# Patient Record
Sex: MALE | Race: WHITE | NOT HISPANIC OR LATINO | URBAN - METROPOLITAN AREA
[De-identification: names, ages, dates, MRNs, and addresses within clinical notes are randomized per-mention and may not be internally consistent; named-entity substitution may affect disease eponyms.]

---

## 2017-02-17 ENCOUNTER — AMBULATORY - HEALTHEAST (OUTPATIENT)
Dept: FAMILY MEDICINE | Facility: CLINIC | Age: 52
End: 2017-02-17

## 2017-02-17 ENCOUNTER — AMBULATORY - HEALTHEAST (OUTPATIENT)
Dept: LAB | Facility: CLINIC | Age: 52
End: 2017-02-17

## 2017-02-17 DIAGNOSIS — Z00.00 HEALTH CARE MAINTENANCE: ICD-10-CM

## 2017-02-20 ENCOUNTER — COMMUNICATION - HEALTHEAST (OUTPATIENT)
Dept: FAMILY MEDICINE | Facility: CLINIC | Age: 52
End: 2017-02-20

## 2017-02-20 LAB — HCV AB SERPL QL IA: NEGATIVE

## 2017-02-24 ENCOUNTER — COMMUNICATION - HEALTHEAST (OUTPATIENT)
Dept: FAMILY MEDICINE | Facility: CLINIC | Age: 52
End: 2017-02-24

## 2017-02-24 DIAGNOSIS — G47.00 INSOMNIA, UNSPECIFIED: ICD-10-CM

## 2017-04-22 ENCOUNTER — COMMUNICATION - HEALTHEAST (OUTPATIENT)
Dept: FAMILY MEDICINE | Facility: CLINIC | Age: 52
End: 2017-04-22

## 2017-04-22 DIAGNOSIS — G47.00 INSOMNIA, UNSPECIFIED: ICD-10-CM

## 2017-06-22 ENCOUNTER — COMMUNICATION - HEALTHEAST (OUTPATIENT)
Dept: FAMILY MEDICINE | Facility: CLINIC | Age: 52
End: 2017-06-22

## 2017-06-22 DIAGNOSIS — I10 HTN (HYPERTENSION): ICD-10-CM

## 2017-06-22 DIAGNOSIS — Z00.00 HEALTH CARE MAINTENANCE: ICD-10-CM

## 2017-07-25 ENCOUNTER — COMMUNICATION - HEALTHEAST (OUTPATIENT)
Dept: FAMILY MEDICINE | Facility: CLINIC | Age: 52
End: 2017-07-25

## 2017-07-25 DIAGNOSIS — G47.00 INSOMNIA, UNSPECIFIED: ICD-10-CM

## 2020-07-16 ENCOUNTER — COMMUNICATION - HEALTHEAST (OUTPATIENT)
Dept: CARDIOLOGY | Facility: CLINIC | Age: 55
End: 2020-07-16

## 2020-07-17 ENCOUNTER — OFFICE VISIT - HEALTHEAST (OUTPATIENT)
Dept: CARDIOLOGY | Facility: CLINIC | Age: 55
End: 2020-07-17

## 2020-07-17 DIAGNOSIS — I10 ESSENTIAL HYPERTENSION: ICD-10-CM

## 2020-07-17 DIAGNOSIS — R55 VASOVAGAL SYNCOPE: ICD-10-CM

## 2020-07-17 DIAGNOSIS — F10.11 H/O ETOH ABUSE: ICD-10-CM

## 2020-07-17 ASSESSMENT — MIFFLIN-ST. JEOR: SCORE: 1607.16

## 2021-06-04 VITALS
RESPIRATION RATE: 16 BRPM | SYSTOLIC BLOOD PRESSURE: 110 MMHG | HEIGHT: 71 IN | BODY MASS INDEX: 23.24 KG/M2 | OXYGEN SATURATION: 97 % | WEIGHT: 166 LBS | HEART RATE: 73 BPM | DIASTOLIC BLOOD PRESSURE: 86 MMHG

## 2021-06-09 NOTE — TELEPHONE ENCOUNTER
Wellness Screening Tool  Symptom Screening:  Do you have one of the following NEW symptoms:    Fever (subjective or >100.0)?  No    A new cough?  No    Shortness of breath?  No     Chills? No     New loss of taste or smell? No     Generalized body aches? No     New persistent headache? No     New sore throat? No     Nausea, vomiting, or diarrhea?  No    Within the past 3 weeks, have you been exposed to someone with a known positive illness below:    COVID-19 (known or suspected)?  No    Chicken pox?  No    Mealses?  No    Pertussis?  No    Patient notified of visitor policy- They may have one person accompany them to their appointment, but they will need to wear a mask and will be screened upon arrival for symptoms: Yes  Pt informed to wear a mask: Yes  Pt notified if they develop any symptoms listed above, prior to their appointment, they are to call the clinic directly at [phone] for further instructions.  Yes  Patient's appointment status: Patient will be seen in clinic as scheduled on 7/17/20.

## 2021-06-29 NOTE — PROGRESS NOTES
Progress Notes by Nasim Lawton MD at 7/17/2020  2:20 PM     Author: Nasim Lawton MD Service: -- Author Type: Physician    Filed: 7/17/2020  3:01 PM Encounter Date: 7/17/2020 Status: Signed    : Nasim Lawton MD (Physician)           Click to link to [facility] NOTE    Thank you, Dr. Jain, for asking me to see Carlos Ivy in consultation at [facility] to evaluate syncope.      Assessment/Plan:   1.  Syncope: Patient's syncope most likely related to dehydration because the patient did not eat and drink of fluid instead of 4-5 beers.  His alcohol level was 225.  A few seconds of her syncopal episode occurred during plasma donation.  Patient had no chest pain or shortness of breath no palpitations.  He did not have recurrent episode, no lightheadedness, dizziness.  His blood pressure and heart rate are in normal range.  No indication for further cardiac evaluation at this time.  I did ask him to call me back if he developed similar symptoms again.  Patient agreed with the plan.    2.  Essential hypertension: His blood pressure is controlled with amlodipine 5 mg daily.    3.  Alcohol abuse: Patient states that he has drinking alcohol for 30 years.  He tried to quit several times but not successful.  He has no interest in quitting alcohol abuse at this time.      Thank you for the opportunity to be involved in the care of Carlos Ivy. If you have any questions, please feel free to contact me.  I will see the patient again as needed.    Much or all of the text in this note was generated through the use of Dragon Dictate voice-to-text software. Errors in spelling or words which seem out of context are unintentional.   Sound alike errors, in particular, may have escaped editing.       History of Present Illness:   It is my pleasure to see Carlos Ivy at the [facility] for evaluation of Consult. Carlos Ivy is a 54 y.o. male with a medical  history of essential hypertension, depression and anxiety, alcohol abuse.    The patient is referred to cardiology clinic from emergency room for evaluation of her syncope.    The patient was donating plasma 9 days ago.  He did not eat and drink water instead of 4-5 beers.  During plasma donation, suddenly he felt lightheaded nausea and then passed out for several seconds.  He never had a syncopal episode.  He denies any chest pain, shortness of breath, palpitations, orthopnea, PND or leg edema.  He had no fever chills and cough.  He was sent to emergency room.  His evaluation was not impressive.  He did not have any more episode of lightheadedness, dizziness, syncope over last 9 days.    His ECG showed sinus rhythm with no ischemic ST-T changes, QT interval 466 ms.  His troponin, BNP are normal.      Past Medical History:     Patient Active Problem List   Diagnosis   ? HTN (hypertension)   ? Depression   ? Anxiety   ? Insomnia   ? H/O ETOH abuse       Past Surgical History:   History reviewed. No pertinent surgical history.    Family History:   Reviewed: No family history of sudden cardiac death or CAD.    Social History:    reports that he has been smoking cigarettes. He has a 20.00 pack-year smoking history. He has never used smokeless tobacco. He reports current alcohol use. He reports that he does not use drugs.    Review of Systems:   General: Weight Loss  Eyes: WNL  Ears/Nose/Throat: WNL  Lungs: WNL  Heart: WNL  Stomach: WNL  Bladder: WNL  Muscle/Joints: WNL  Skin: WNL  Nervous System: WNL  Mental Health: WNL     Blood: WNL    Meds:     Current Outpatient Medications:   ?  amLODIPine (NORVASC) 10 MG tablet, Take 1 tablet (10 mg total) by mouth daily., Disp: 90 tablet, Rfl: 1  ?  escitalopram oxalate (LEXAPRO) 20 MG tablet, Take 1 tablet (20 mg total) by mouth daily., Disp: 90 tablet, Rfl: 3  ?  traZODone (DESYREL) 50 MG tablet, TAKE 1 TO 3 TABLETS BY MOUTH EVERY NIGHT AT BEDTIME AS NEEDED FOR SLEEP, Disp: 270  "tablet, Rfl: 0  ?  cyclobenzaprine (FLEXERIL) 10 MG tablet, Take 1 tablet (10 mg total) by mouth every 8 (eight) hours as needed for muscle spasms., Disp: 15 tablet, Rfl: 1  ?  diphenhydrAMINE (BENADRYL) 25 mg capsule, Take 1-2 capsules (25-50 mg total) by mouth as needed., Disp: 30 capsule, Rfl: 2  ?  QUEtiapine (SEROQUEL) 100 MG tablet, Take 1-2 tablets at night, Disp: 180 tablet, Rfl: 3  ?  QUEtiapine (SEROQUEL) 100 MG tablet, Take 1-2 tablets (100-200 mg total) by mouth at bedtime., Disp: 180 tablet, Rfl: 0  No current facility-administered medications for this visit.     Facility-Administered Medications Ordered in Other Visits:   ?  iohexol 350 mg iodine/mL injection 100 mL (OMNIPAQUE), 100 mL, Intravenous, Once in imaging, Miguel Angel Broussard MD     Allergies:   Patient has no known allergies.    Objective:      Physical Exam  166 lb (75.3 kg)  5' 10.5\" (1.791 m)  Body mass index is 23.48 kg/m .  /86 (Patient Site: Left Leg, Patient Position: Sitting, Cuff Size: Adult Regular)   Pulse 73   Resp 16   Ht 5' 10.5\" (1.791 m)   Wt 166 lb (75.3 kg) Comment: With shoes.  SpO2 97%   BMI 23.48 kg/m      General Appearance:   Awake, Alert, No acute distress.   HEENT:  Pupil equal, reactive to light. No scleral icterus; the mucous membranes were moist. No oral ulcers or thrush.    Neck: No cervical bruits. No JVD. No thyromegaly. No lymph node enlargement or tenderness.   Chest: The spine was straight. The chest was symmetric.   Lungs:   Respirations unlabored. Lungs are clear to auscultation. No crackles. No wheezing.   Cardiovascular:   RRR, normal first and second heart sounds with no murmurs. No rubs or gallops.    Abdomen:  Soft. No tenderness. Non-distended. Bowels sounds are present   Extremities: Equal posterior tibial pulses. No leg edema.   Skin: No rashes or ulcers. Warm, Dry.   Musculoskeletal: No tenderness. No deformity.   Neurologic: Mood and affect are appropriate. No focal deficits.     "     EKG:  Personally reivewed  Normal sinus rhythm   Prolonged  ms  Abnormal ECG   No previous ECGs available    Lab Review   Lab Results   Component Value Date     (L) 07/08/2020    K 3.8 07/08/2020     07/08/2020    CO2 16 (L) 07/08/2020    BUN 3 (L) 07/08/2020    CREATININE 0.70 07/08/2020    CALCIUM 7.9 (L) 07/08/2020     Lab Results   Component Value Date    WBC 5.6 07/08/2020    HGB 14.0 07/08/2020    HCT 39.6 (L) 07/08/2020    MCV 91 07/08/2020     07/08/2020     Lab Results   Component Value Date    CHOL 168 02/25/2016    TRIG 91 02/25/2016    HDL 70 02/25/2016     Lab Results   Component Value Date    TROPONINI <0.01 07/08/2020     Lab Results   Component Value Date    BNP <10 07/08/2020     Lab Results   Component Value Date    TSH 1.06 07/08/2020

## 2025-01-22 ENCOUNTER — APPOINTMENT (OUTPATIENT)
Dept: CT IMAGING | Facility: HOSPITAL | Age: 60
End: 2025-01-22
Attending: INTERNAL MEDICINE
Payer: COMMERCIAL

## 2025-01-22 ENCOUNTER — APPOINTMENT (OUTPATIENT)
Dept: CT IMAGING | Facility: HOSPITAL | Age: 60
End: 2025-01-22
Attending: EMERGENCY MEDICINE
Payer: COMMERCIAL

## 2025-01-22 ENCOUNTER — HOSPITAL ENCOUNTER (INPATIENT)
Facility: HOSPITAL | Age: 60
End: 2025-01-22
Attending: EMERGENCY MEDICINE | Admitting: INTERNAL MEDICINE
Payer: COMMERCIAL

## 2025-01-22 ENCOUNTER — APPOINTMENT (OUTPATIENT)
Dept: NEUROLOGY | Facility: HOSPITAL | Age: 60
End: 2025-01-22
Attending: INTERNAL MEDICINE
Payer: COMMERCIAL

## 2025-01-22 DIAGNOSIS — R56.9 ALCOHOL WITHDRAWAL SEIZURE WITH COMPLICATION (H): Primary | ICD-10-CM

## 2025-01-22 DIAGNOSIS — R33.8 ACUTE URINARY RETENTION: ICD-10-CM

## 2025-01-22 DIAGNOSIS — E88.89 ALCOHOLIC KETOSIS (H): ICD-10-CM

## 2025-01-22 DIAGNOSIS — E56.9 VITAMIN DEFICIENCY: ICD-10-CM

## 2025-01-22 DIAGNOSIS — F10.939 ALCOHOL WITHDRAWAL SEIZURE WITH COMPLICATION (H): Primary | ICD-10-CM

## 2025-01-22 DIAGNOSIS — R52 PAIN: ICD-10-CM

## 2025-01-22 DIAGNOSIS — F10.90 ALCOHOL USE DISORDER: ICD-10-CM

## 2025-01-22 LAB
ALBUMIN SERPL BCG-MCNC: 3.8 G/DL (ref 3.5–5.2)
ALBUMIN UR-MCNC: NEGATIVE MG/DL
ALP SERPL-CCNC: 62 U/L (ref 40–150)
ALT SERPL W P-5'-P-CCNC: 46 U/L (ref 0–70)
AMMONIA PLAS-SCNC: 12 UMOL/L (ref 16–60)
AMPHETAMINES UR QL SCN: ABNORMAL
ANION GAP SERPL CALCULATED.3IONS-SCNC: 18 MMOL/L (ref 7–15)
APPEARANCE UR: CLEAR
AST SERPL W P-5'-P-CCNC: 42 U/L (ref 0–45)
BARBITURATES UR QL SCN: ABNORMAL
BASOPHILS # BLD AUTO: 0.1 10E3/UL (ref 0–0.2)
BASOPHILS NFR BLD AUTO: 1 %
BENZODIAZ UR QL SCN: ABNORMAL
BILIRUB DIRECT SERPL-MCNC: 0.43 MG/DL (ref 0–0.3)
BILIRUB SERPL-MCNC: 1.3 MG/DL
BILIRUB UR QL STRIP: NEGATIVE
BUN SERPL-MCNC: 18.7 MG/DL (ref 6–20)
BZE UR QL SCN: ABNORMAL
CALCIUM SERPL-MCNC: 9.7 MG/DL (ref 8.8–10.4)
CANNABINOIDS UR QL SCN: ABNORMAL
CHLORIDE SERPL-SCNC: 96 MMOL/L (ref 98–107)
COLOR UR AUTO: ABNORMAL
CREAT SERPL-MCNC: 0.74 MG/DL (ref 0.67–1.17)
EGFRCR SERPLBLD CKD-EPI 2021: >90 ML/MIN/1.73M2
EOSINOPHIL # BLD AUTO: 0 10E3/UL (ref 0–0.7)
EOSINOPHIL NFR BLD AUTO: 0 %
ERYTHROCYTE [DISTWIDTH] IN BLOOD BY AUTOMATED COUNT: 13.5 % (ref 10–15)
ETHANOL SERPL-MCNC: <0.01 G/DL
FENTANYL UR QL: ABNORMAL
GLUCOSE SERPL-MCNC: 106 MG/DL (ref 70–99)
GLUCOSE UR STRIP-MCNC: NEGATIVE MG/DL
HCO3 SERPL-SCNC: 20 MMOL/L (ref 22–29)
HCT VFR BLD AUTO: 37.9 % (ref 40–53)
HGB BLD-MCNC: 13.7 G/DL (ref 13.3–17.7)
HGB UR QL STRIP: NEGATIVE
HOLD SPECIMEN: NORMAL
IMM GRANULOCYTES # BLD: 0.1 10E3/UL
IMM GRANULOCYTES NFR BLD: 1 %
KETONES UR STRIP-MCNC: 60 MG/DL
LEUKOCYTE ESTERASE UR QL STRIP: NEGATIVE
LIPASE SERPL-CCNC: 30 U/L (ref 13–60)
LYMPHOCYTES # BLD AUTO: 1.9 10E3/UL (ref 0.8–5.3)
LYMPHOCYTES NFR BLD AUTO: 19 %
MAGNESIUM SERPL-MCNC: 1.9 MG/DL (ref 1.7–2.3)
MCH RBC QN AUTO: 32.2 PG (ref 26.5–33)
MCHC RBC AUTO-ENTMCNC: 36.1 G/DL (ref 31.5–36.5)
MCV RBC AUTO: 89 FL (ref 78–100)
MONOCYTES # BLD AUTO: 0.9 10E3/UL (ref 0–1.3)
MONOCYTES NFR BLD AUTO: 9 %
MUCOUS THREADS #/AREA URNS LPF: PRESENT /LPF
NEUTROPHILS # BLD AUTO: 7.1 10E3/UL (ref 1.6–8.3)
NEUTROPHILS NFR BLD AUTO: 71 %
NITRATE UR QL: NEGATIVE
NRBC # BLD AUTO: 0 10E3/UL
NRBC BLD AUTO-RTO: 0 /100
OPIATES UR QL SCN: ABNORMAL
PCP QUAL URINE (ROCHE): ABNORMAL
PH UR STRIP: 6 [PH] (ref 5–7)
PHOSPHATE SERPL-MCNC: 3.1 MG/DL (ref 2.5–4.5)
PLATELET # BLD AUTO: 180 10E3/UL (ref 150–450)
POTASSIUM SERPL-SCNC: 3.3 MMOL/L (ref 3.4–5.3)
POTASSIUM SERPL-SCNC: 3.4 MMOL/L (ref 3.4–5.3)
PROT SERPL-MCNC: 6.9 G/DL (ref 6.4–8.3)
RBC # BLD AUTO: 4.25 10E6/UL (ref 4.4–5.9)
RBC URINE: <1 /HPF
SODIUM SERPL-SCNC: 134 MMOL/L (ref 135–145)
SP GR UR STRIP: 1.02 (ref 1–1.03)
SQUAMOUS EPITHELIAL: <1 /HPF
UROBILINOGEN UR STRIP-MCNC: 4 MG/DL
WBC # BLD AUTO: 10 10E3/UL (ref 4–11)
WBC URINE: 2 /HPF

## 2025-01-22 PROCEDURE — 74177 CT ABD & PELVIS W/CONTRAST: CPT

## 2025-01-22 PROCEDURE — 70450 CT HEAD/BRAIN W/O DYE: CPT

## 2025-01-22 PROCEDURE — G0378 HOSPITAL OBSERVATION PER HR: HCPCS

## 2025-01-22 PROCEDURE — 96375 TX/PRO/DX INJ NEW DRUG ADDON: CPT

## 2025-01-22 PROCEDURE — 83735 ASSAY OF MAGNESIUM: CPT | Performed by: EMERGENCY MEDICINE

## 2025-01-22 PROCEDURE — 82140 ASSAY OF AMMONIA: CPT | Performed by: INTERNAL MEDICINE

## 2025-01-22 PROCEDURE — 84132 ASSAY OF SERUM POTASSIUM: CPT | Performed by: INTERNAL MEDICINE

## 2025-01-22 PROCEDURE — 36415 COLL VENOUS BLD VENIPUNCTURE: CPT | Performed by: EMERGENCY MEDICINE

## 2025-01-22 PROCEDURE — 96361 HYDRATE IV INFUSION ADD-ON: CPT

## 2025-01-22 PROCEDURE — 99223 1ST HOSP IP/OBS HIGH 75: CPT | Mod: AI | Performed by: INTERNAL MEDICINE

## 2025-01-22 PROCEDURE — 258N000003 HC RX IP 258 OP 636: Performed by: EMERGENCY MEDICINE

## 2025-01-22 PROCEDURE — HZ2ZZZZ DETOXIFICATION SERVICES FOR SUBSTANCE ABUSE TREATMENT: ICD-10-PCS | Performed by: INTERNAL MEDICINE

## 2025-01-22 PROCEDURE — 72125 CT NECK SPINE W/O DYE: CPT

## 2025-01-22 PROCEDURE — 81003 URINALYSIS AUTO W/O SCOPE: CPT | Performed by: INTERNAL MEDICINE

## 2025-01-22 PROCEDURE — 258N000001 HC RX 258: Performed by: INTERNAL MEDICINE

## 2025-01-22 PROCEDURE — 95816 EEG AWAKE AND DROWSY: CPT | Mod: 26 | Performed by: PSYCHIATRY & NEUROLOGY

## 2025-01-22 PROCEDURE — 250N000011 HC RX IP 250 OP 636: Performed by: INTERNAL MEDICINE

## 2025-01-22 PROCEDURE — 96360 HYDRATION IV INFUSION INIT: CPT

## 2025-01-22 PROCEDURE — 82248 BILIRUBIN DIRECT: CPT | Performed by: EMERGENCY MEDICINE

## 2025-01-22 PROCEDURE — 82077 ASSAY SPEC XCP UR&BREATH IA: CPT | Performed by: EMERGENCY MEDICINE

## 2025-01-22 PROCEDURE — 80053 COMPREHEN METABOLIC PANEL: CPT | Performed by: EMERGENCY MEDICINE

## 2025-01-22 PROCEDURE — 83690 ASSAY OF LIPASE: CPT | Performed by: INTERNAL MEDICINE

## 2025-01-22 PROCEDURE — 93005 ELECTROCARDIOGRAM TRACING: CPT | Performed by: EMERGENCY MEDICINE

## 2025-01-22 PROCEDURE — 95819 EEG AWAKE AND ASLEEP: CPT

## 2025-01-22 PROCEDURE — 84100 ASSAY OF PHOSPHORUS: CPT | Performed by: INTERNAL MEDICINE

## 2025-01-22 PROCEDURE — 96374 THER/PROPH/DIAG INJ IV PUSH: CPT

## 2025-01-22 PROCEDURE — 36415 COLL VENOUS BLD VENIPUNCTURE: CPT | Performed by: INTERNAL MEDICINE

## 2025-01-22 PROCEDURE — 99285 EMERGENCY DEPT VISIT HI MDM: CPT | Mod: 25

## 2025-01-22 PROCEDURE — 85025 COMPLETE CBC W/AUTO DIFF WBC: CPT | Performed by: EMERGENCY MEDICINE

## 2025-01-22 PROCEDURE — 80307 DRUG TEST PRSMV CHEM ANLYZR: CPT | Performed by: EMERGENCY MEDICINE

## 2025-01-22 PROCEDURE — 250N000013 HC RX MED GY IP 250 OP 250 PS 637: Performed by: INTERNAL MEDICINE

## 2025-01-22 PROCEDURE — 96376 TX/PRO/DX INJ SAME DRUG ADON: CPT

## 2025-01-22 RX ORDER — GABAPENTIN 300 MG/1
300 CAPSULE ORAL EVERY 8 HOURS
Status: DISCONTINUED | OUTPATIENT
Start: 2025-01-27 | End: 2025-01-28 | Stop reason: HOSPADM

## 2025-01-22 RX ORDER — FLUMAZENIL 0.1 MG/ML
0.2 INJECTION, SOLUTION INTRAVENOUS
Status: DISCONTINUED | OUTPATIENT
Start: 2025-01-22 | End: 2025-01-22

## 2025-01-22 RX ORDER — DIAZEPAM 10 MG/2ML
5-10 INJECTION, SOLUTION INTRAMUSCULAR; INTRAVENOUS EVERY 30 MIN PRN
Status: DISCONTINUED | OUTPATIENT
Start: 2025-01-22 | End: 2025-01-26

## 2025-01-22 RX ORDER — AMLODIPINE BESYLATE 2.5 MG/1
2.5 TABLET ORAL DAILY
Status: DISCONTINUED | OUTPATIENT
Start: 2025-01-22 | End: 2025-01-28 | Stop reason: HOSPADM

## 2025-01-22 RX ORDER — THIAMINE HYDROCHLORIDE 100 MG/ML
100 INJECTION, SOLUTION INTRAMUSCULAR; INTRAVENOUS DAILY
Status: DISCONTINUED | OUTPATIENT
Start: 2025-01-22 | End: 2025-01-22

## 2025-01-22 RX ORDER — ACETAMINOPHEN 325 MG/1
650 TABLET ORAL EVERY 4 HOURS PRN
Status: DISCONTINUED | OUTPATIENT
Start: 2025-01-22 | End: 2025-01-28 | Stop reason: HOSPADM

## 2025-01-22 RX ORDER — DIAZEPAM 10 MG/1
10 TABLET ORAL EVERY 30 MIN PRN
Status: DISCONTINUED | OUTPATIENT
Start: 2025-01-22 | End: 2025-01-22

## 2025-01-22 RX ORDER — FLUMAZENIL 0.1 MG/ML
0.2 INJECTION, SOLUTION INTRAVENOUS
Status: DISCONTINUED | OUTPATIENT
Start: 2025-01-22 | End: 2025-01-28 | Stop reason: HOSPADM

## 2025-01-22 RX ORDER — HALOPERIDOL 5 MG/ML
2.5-5 INJECTION INTRAMUSCULAR EVERY 6 HOURS PRN
Status: DISCONTINUED | OUTPATIENT
Start: 2025-01-22 | End: 2025-01-22

## 2025-01-22 RX ORDER — AMOXICILLIN 250 MG
2 CAPSULE ORAL 2 TIMES DAILY PRN
Status: DISCONTINUED | OUTPATIENT
Start: 2025-01-22 | End: 2025-01-28 | Stop reason: HOSPADM

## 2025-01-22 RX ORDER — GABAPENTIN 300 MG/1
900 CAPSULE ORAL EVERY 8 HOURS
Status: COMPLETED | OUTPATIENT
Start: 2025-01-22 | End: 2025-01-25

## 2025-01-22 RX ORDER — AMOXICILLIN 250 MG
1 CAPSULE ORAL 2 TIMES DAILY PRN
Status: DISCONTINUED | OUTPATIENT
Start: 2025-01-22 | End: 2025-01-28 | Stop reason: HOSPADM

## 2025-01-22 RX ORDER — FOLIC ACID 5 MG/ML
1 INJECTION, SOLUTION INTRAMUSCULAR; INTRAVENOUS; SUBCUTANEOUS DAILY
Status: DISCONTINUED | OUTPATIENT
Start: 2025-01-22 | End: 2025-01-27

## 2025-01-22 RX ORDER — CALCIUM CARBONATE 500 MG/1
1000 TABLET, CHEWABLE ORAL 4 TIMES DAILY PRN
Status: DISCONTINUED | OUTPATIENT
Start: 2025-01-22 | End: 2025-01-28 | Stop reason: HOSPADM

## 2025-01-22 RX ORDER — OLANZAPINE 5 MG/1
5-10 TABLET, ORALLY DISINTEGRATING ORAL EVERY 6 HOURS PRN
Status: DISCONTINUED | OUTPATIENT
Start: 2025-01-22 | End: 2025-01-22

## 2025-01-22 RX ORDER — DIAZEPAM 10 MG/1
10 TABLET ORAL EVERY 30 MIN PRN
Status: DISCONTINUED | OUTPATIENT
Start: 2025-01-22 | End: 2025-01-26

## 2025-01-22 RX ORDER — DEXTROSE MONOHYDRATE, SODIUM CHLORIDE, AND POTASSIUM CHLORIDE 50; 1.49; 9 G/1000ML; G/1000ML; G/1000ML
INJECTION, SOLUTION INTRAVENOUS CONTINUOUS
Status: DISCONTINUED | OUTPATIENT
Start: 2025-01-22 | End: 2025-01-25

## 2025-01-22 RX ORDER — OLANZAPINE 5 MG/1
5-10 TABLET, ORALLY DISINTEGRATING ORAL EVERY 6 HOURS PRN
Status: DISCONTINUED | OUTPATIENT
Start: 2025-01-22 | End: 2025-01-28 | Stop reason: HOSPADM

## 2025-01-22 RX ORDER — HALOPERIDOL 5 MG/ML
2.5-5 INJECTION INTRAMUSCULAR EVERY 6 HOURS PRN
Status: DISCONTINUED | OUTPATIENT
Start: 2025-01-22 | End: 2025-01-28 | Stop reason: HOSPADM

## 2025-01-22 RX ORDER — THIAMINE HYDROCHLORIDE 100 MG/ML
500 INJECTION, SOLUTION INTRAMUSCULAR; INTRAVENOUS 3 TIMES DAILY
Status: COMPLETED | OUTPATIENT
Start: 2025-01-22 | End: 2025-01-24

## 2025-01-22 RX ORDER — POTASSIUM CHLORIDE 1.5 G/1.58G
40 POWDER, FOR SOLUTION ORAL ONCE
Status: COMPLETED | OUTPATIENT
Start: 2025-01-22 | End: 2025-01-22

## 2025-01-22 RX ORDER — GABAPENTIN 400 MG/1
1200 CAPSULE ORAL ONCE
Status: COMPLETED | OUTPATIENT
Start: 2025-01-22 | End: 2025-01-22

## 2025-01-22 RX ORDER — MULTIPLE VITAMINS W/ MINERALS TAB 9MG-400MCG
1 TAB ORAL DAILY
Status: DISCONTINUED | OUTPATIENT
Start: 2025-01-22 | End: 2025-01-28 | Stop reason: HOSPADM

## 2025-01-22 RX ORDER — PANTOPRAZOLE SODIUM 20 MG/1
20 TABLET, DELAYED RELEASE ORAL
Status: DISCONTINUED | OUTPATIENT
Start: 2025-01-22 | End: 2025-01-28 | Stop reason: HOSPADM

## 2025-01-22 RX ORDER — DIAZEPAM 10 MG/1
10 TABLET ORAL EVERY 6 HOURS
Status: DISCONTINUED | OUTPATIENT
Start: 2025-01-22 | End: 2025-01-23

## 2025-01-22 RX ORDER — DIAZEPAM 10 MG/2ML
5-10 INJECTION, SOLUTION INTRAMUSCULAR; INTRAVENOUS EVERY 30 MIN PRN
Status: DISCONTINUED | OUTPATIENT
Start: 2025-01-22 | End: 2025-01-22

## 2025-01-22 RX ORDER — POLYETHYLENE GLYCOL 3350 17 G/17G
17 POWDER, FOR SOLUTION ORAL 2 TIMES DAILY PRN
Status: DISCONTINUED | OUTPATIENT
Start: 2025-01-22 | End: 2025-01-28 | Stop reason: HOSPADM

## 2025-01-22 RX ORDER — CLONIDINE HYDROCHLORIDE 0.1 MG/1
0.1 TABLET ORAL EVERY 8 HOURS
Status: DISCONTINUED | OUTPATIENT
Start: 2025-01-22 | End: 2025-01-23

## 2025-01-22 RX ORDER — ACETAMINOPHEN 650 MG/1
650 SUPPOSITORY RECTAL EVERY 4 HOURS PRN
Status: DISCONTINUED | OUTPATIENT
Start: 2025-01-22 | End: 2025-01-28 | Stop reason: HOSPADM

## 2025-01-22 RX ORDER — PROCHLORPERAZINE MALEATE 10 MG
10 TABLET ORAL EVERY 6 HOURS PRN
Status: DISCONTINUED | OUTPATIENT
Start: 2025-01-22 | End: 2025-01-28 | Stop reason: HOSPADM

## 2025-01-22 RX ORDER — IOPAMIDOL 755 MG/ML
90 INJECTION, SOLUTION INTRAVASCULAR ONCE
Status: COMPLETED | OUTPATIENT
Start: 2025-01-22 | End: 2025-01-22

## 2025-01-22 RX ORDER — HYDRALAZINE HYDROCHLORIDE 20 MG/ML
10 INJECTION INTRAMUSCULAR; INTRAVENOUS EVERY 4 HOURS PRN
Status: DISCONTINUED | OUTPATIENT
Start: 2025-01-22 | End: 2025-01-28 | Stop reason: HOSPADM

## 2025-01-22 RX ORDER — THIAMINE HYDROCHLORIDE 100 MG/ML
250 INJECTION, SOLUTION INTRAMUSCULAR; INTRAVENOUS DAILY
Status: DISCONTINUED | OUTPATIENT
Start: 2025-01-25 | End: 2025-01-27

## 2025-01-22 RX ORDER — GABAPENTIN 300 MG/1
600 CAPSULE ORAL EVERY 8 HOURS
Status: COMPLETED | OUTPATIENT
Start: 2025-01-25 | End: 2025-01-27

## 2025-01-22 RX ORDER — GABAPENTIN 100 MG/1
100 CAPSULE ORAL EVERY 8 HOURS
Status: DISCONTINUED | OUTPATIENT
Start: 2025-01-29 | End: 2025-01-28 | Stop reason: HOSPADM

## 2025-01-22 RX ADMIN — CLONIDINE HYDROCHLORIDE 0.1 MG: 0.1 TABLET ORAL at 14:26

## 2025-01-22 RX ADMIN — THIAMINE HYDROCHLORIDE 100 MG: 100 INJECTION, SOLUTION INTRAMUSCULAR; INTRAVENOUS at 14:26

## 2025-01-22 RX ADMIN — AMLODIPINE BESYLATE 2.5 MG: 2.5 TABLET ORAL at 14:25

## 2025-01-22 RX ADMIN — THIAMINE HYDROCHLORIDE 500 MG: 100 INJECTION, SOLUTION INTRAMUSCULAR; INTRAVENOUS at 20:17

## 2025-01-22 RX ADMIN — POTASSIUM CHLORIDE, DEXTROSE MONOHYDRATE AND SODIUM CHLORIDE: 150; 5; 900 INJECTION, SOLUTION INTRAVENOUS at 17:52

## 2025-01-22 RX ADMIN — IOPAMIDOL 90 ML: 755 INJECTION, SOLUTION INTRAVENOUS at 21:14

## 2025-01-22 RX ADMIN — DIAZEPAM 10 MG: 10 INJECTION, SOLUTION INTRAMUSCULAR; INTRAVENOUS at 17:53

## 2025-01-22 RX ADMIN — Medication 1 TABLET: at 14:26

## 2025-01-22 RX ADMIN — SODIUM CHLORIDE 1000 ML: 9 INJECTION, SOLUTION INTRAVENOUS at 10:33

## 2025-01-22 RX ADMIN — FOLIC ACID 1 MG: 5 INJECTION, SOLUTION INTRAMUSCULAR; INTRAVENOUS; SUBCUTANEOUS at 14:26

## 2025-01-22 RX ADMIN — GABAPENTIN 1200 MG: 400 CAPSULE ORAL at 14:25

## 2025-01-22 RX ADMIN — DIAZEPAM 10 MG: 10 TABLET ORAL at 14:25

## 2025-01-22 RX ADMIN — HYDRALAZINE HYDROCHLORIDE 10 MG: 20 INJECTION INTRAMUSCULAR; INTRAVENOUS at 16:19

## 2025-01-22 ASSESSMENT — LIFESTYLE VARIABLES
VISUAL DISTURBANCES: NOT PRESENT
NAUSEA AND VOMITING: NO NAUSEA AND NO VOMITING
TREMOR: MODERATE, WITH PATIENT'S ARMS EXTENDED
AGITATION: NORMAL ACTIVITY
ANXIETY: NO ANXIETY, AT EASE
HEADACHE, FULLNESS IN HEAD: VERY MILD
PAROXYSMAL SWEATS: NO SWEAT VISIBLE
TACTILE DISTURBANCES: MODERATE ITCHING, PINS AND NEEDLES, BURNING OR NUMBNESS
ORIENTATION AND CLOUDING OF SENSORIUM: ORIENTED AND CAN DO SERIAL ADDITIONS
AUDITORY DISTURBANCES: MODERATE HARSHNESS OR ABILITY TO FRIGHTEN
TOTAL SCORE: 11

## 2025-01-22 ASSESSMENT — COLUMBIA-SUICIDE SEVERITY RATING SCALE - C-SSRS
2. HAVE YOU ACTUALLY HAD ANY THOUGHTS OF KILLING YOURSELF IN THE PAST MONTH?: NO
6. HAVE YOU EVER DONE ANYTHING, STARTED TO DO ANYTHING, OR PREPARED TO DO ANYTHING TO END YOUR LIFE?: NO
1. IN THE PAST MONTH, HAVE YOU WISHED YOU WERE DEAD OR WISHED YOU COULD GO TO SLEEP AND NOT WAKE UP?: NO

## 2025-01-22 ASSESSMENT — ACTIVITIES OF DAILY LIVING (ADL)
ADLS_ACUITY_SCORE: 41
ADLS_ACUITY_SCORE: 41
DEPENDENT_IADLS:: CLEANING;COOKING;LAUNDRY;SHOPPING;MEAL PREPARATION;TRANSPORTATION
ADLS_ACUITY_SCORE: 41
ADLS_ACUITY_SCORE: 29
ADLS_ACUITY_SCORE: 27
ADLS_ACUITY_SCORE: 41
ADLS_ACUITY_SCORE: 29
ADLS_ACUITY_SCORE: 29
ADLS_ACUITY_SCORE: 41
ADLS_ACUITY_SCORE: 29
ADLS_ACUITY_SCORE: 29
ADLS_ACUITY_SCORE: 41
ADLS_ACUITY_SCORE: 29

## 2025-01-22 NOTE — PROGRESS NOTES
Bedside EEG was performed that included photic, auditory, and sensory stimulation. Hyperventilation was not possible given the patient's clinical state.  Skin intact.  EEG #     [id number] EEG system used.    Neurology dictation to follow up.

## 2025-01-22 NOTE — ED NOTES
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)  Comment:   Plan:        PMH:    Past Medical History:   Diagnosis Date    Alcohol abuse     Anxiety     Back pain, chronic     Depression     Hypertension     Substance abuse (H)         Code Status:  Full Code     Falls Risk: Yes Band: Applied    Current Living Situation/Residence: lives with his mother     Elimination Status: Continent: Yes     Activity Level: SBA    Patients Preferred Language:  English     Needed: No    Vital Signs:  BP (!) 162/89   Pulse 94   Temp 98.3  F (36.8  C)   Resp 28   Ht 1.829 m (6')   Wt 81.6 kg (180 lb)   SpO2 100%   BMI 24.41 kg/m       Cardiac Rhythm: NSR    Pain Score: 0/10    Is the Patient Confused:  Yes Where is the patient located?    Last Food or Drink: 01/22/25 at 1400--fluids    Focused Assessment:      Patient is a chronic alcoholic--drinks daily-lives with his mother--his mother saw him this am--later she went to check on him and he was unresponsive--911 called--when they arrived patient was alert--Is currently alert and oriented X 3 but does have some intermittent confusion and forgetfulness--questioning if he may have had a seizure this am?   CIWA done--scheduled valium given--Patient hypertensive--has not been taking his meds--took his pills without difficult.  Using urinal.  Mother at the bedside--aware of plan of care.           Tests Performed: Done: Labs and Imaging    Treatments Provided:      Family Dynamics/Concerns: No    Family Updated On Visitor Policy: Yes    Plan of Care Communicated to Family: Yes    Who Was Updated about Plan of Care:   mother  Belongings Checklist Done and Signed by  Patient: N/A    Medications sent with patient: none    Covid: asymptomatic , not tested    Additional Information:     Briana Kelley RN 1/22/2025 2:56 PM

## 2025-01-22 NOTE — PLAN OF CARE
Goal Outcome Evaluation:      Plan of Care Reviewed With: patient, parent          Outcome Evaluation: Unknown at this time.

## 2025-01-22 NOTE — CONSULTS
"Care Management Initial Consult    General Information  Assessment completed with: Patient, Parents, Carlos not answering many questions. Mom Nita present and answering questions.  Type of CM/SW Visit: Initial Assessment    Primary Care Provider verified and updated as needed: Yes (Updated on fachesheet, but \"he rarely sees them. He stopped taking all his medications\".)   Readmission within the last 30 days: no previous admission in last 30 days      Reason for Consult: discharge planning  Advance Care Planning: Advance Care Planning Reviewed: no concerns identified (\"doesn't have one\")          Communication Assessment  Patient's communication style: spoken language (English or Bilingual)                     Living Environment:   People in home: parent(s)  Carlos and mom Nita  Current living Arrangements: house (\"2 story house. 1 steps to enter the house. And then a flight of steps to the bedrooms on the 2nd level\".)      Able to return to prior arrangements: yes       Family/Social Support:  Care provided by: self, parent(s) (\"mom\")  Provides care for: no one, unable/limited ability to care for self  Marital Status: Single  Support system:            Description of Support System: Supportive, Involved    Support Assessment: Adequate family and caregiver support, Adequate social supports, Patient communicates needs well met    Current Resources:   Patient receiving home care services: No        Community Resources: None  Equipment currently used at home: none  Supplies currently used at home: Other (\"dentures\")    Employment/Financial:  Employment Status: unemployed     Employment/ Comments: \"no  history\"  Financial Concerns: unemployed   Referral to Financial Worker: No       Does the patient's insurance plan have a 3 day qualifying hospital stay waiver?  No    Lifestyle & Psychosocial Needs:  Social Drivers of Health     Food Insecurity: Food Insecurity Present (11/1/2022)    Received from " "[facility] [employer] [facility]    Food Insecurity     Worried About Running Out of Food in the Last Year: 2   Depression: At risk (11/1/2022)    Received from [employer] [facility]    PHQ-2     PHQ-2 TOTAL SCORE: 4   Housing Stability: Low Risk  (11/1/2022)    Received from [facility] [employer] [facility]    Housing Stability     Unable to Pay for Housing in the Last Year: 1   Tobacco Use: High Risk (2/29/2024)    [facility]    Patient History     Smoking Tobacco Use: Every Day     Smokeless Tobacco Use: Never     Passive Exposure: Not on file   Financial Resource Strain: Medium Risk (11/1/2022)    Received from [employer] [facility]    Financial Resource Strain     Difficulty of Paying Living Expenses: Not on file     Difficulty of Paying Living Expenses: 3   Alcohol Use: Not on file   Transportation Needs: No Transportation Needs (11/1/2022)    Received from [employer] [facility]    Transportation Needs     Lack of Transportation (Medical): 1   Physical Activity: Not on file   Interpersonal Safety: Not on file   Stress: Not on file   Social Connections: Unknown (11/2/2023)    Received from [employer] [facility]    Social Connections     Frequency of Communication with Friends and Family: Not on file   Health Literacy: Not on file       Functional Status:  Prior to admission patient needed assistance:   Dependent ADLs:: Independent, Ambulation-no assistive device  Dependent IADLs:: Cleaning, Cooking, Laundry, Shopping, Meal Preparation, Transportation (\"mom helps\")  Assesssment of Functional Status:  (unknown at this time)    Mental Health Status:  Mental Health Status: Past Concern       Chemical Dependency Status:  Chemical Dependency Status: Current Concern             Values/Beliefs:  Spiritual, Cultural Beliefs, Advent Practices, Values that " "affect care: no               Discussed  Partnership in Safe Discharge Planning  document with patient/family: No    Additional Information:  Carlos lives in a house with his mother Nita. The house is a \"2 story house. 1 steps to enter the house. And then a flight of steps to the bedrooms on the 2nd level\".    He is independent with ADLs and mom helps with IADLs. He doesn't drive. He doesn't work and is not on disability.    Mom to transport at discharge.    CM to follow for medical progression of care, discharge recommendations, and final discharge plan. Writer verified patient demographics and updated any changes needed in the patient chart.    Next Steps:   Plan: Unknown at this time.  Needs: May need  follow up for ETOH use.    Pau Overton RN    "

## 2025-01-22 NOTE — ED NOTES
Bed: [place] [address]  Expected date:   Expected time:   Means of arrival: Ambulance  Comments:  [facility]: 59m AMS, rhonchi, lethargic

## 2025-01-22 NOTE — H&P
[facility]    History and Physical - Hospitalist Service       Date of Admission:  1/22/2025    Assessment & Plan   Principal Problem:    Alcohol use disorder  Active Problems:    Alcoholic ketosis (H)    Alcohol withdrawal seizure with complication (H)       Carlos Ivy is a 59 year old male with history of daily alcohol use, history of withdrawal, history of mood disorder and HTN who is not taking his chronic medications and doesn't seek regular medical care is admitted on 1/22/2025 after an unresponsive episode and reports of gross hematuria.       Toxic encephalopathy  Unresponsive episode  Alcohol abuse  Mother endorses several days of poor po intake and lack of normal daily alcohol intake, last drink probably a few days ago. Patient denies IDU  Ethanol level on admission negative  Patient unable to recall events of last night, this AM or today  Head CT and neck negative  BMP shows mild AGMA likely related to alcoholic/starvation ketosis with recent poor po intake  Unclear if patient had seizure  -- check EEG  -- start CIWA protocol and start scheduled diazepam given heavy daily Etoh use  -- check UDS and ammonia  -- start IVF  -- place on high dose thiamine given risk of Wernicke's      Hypokalemia: replace and recheck      AGMA: mild, likely starvation ketosis due to poor po intake last several days  -- start dextrose containing IVF  -- trend BMP  -- Hc03 not low enough to warrant bicarb drip for now      Mild hyponatremia: suspect beer potomania  -- monitor for improvement with infusion of isotonic IVF      Abnormal LFT's: suspect related to Etoh abuse  -- trend      Chronic neck pain:  CT C-Spine on admission shows mild degenerative anterolisthesis of C4 upon C5   -- has not been taking home gabapentin or meloxicam PTA  -- will use gabapentin dosing as part of CIWA protocol for now  -- escalate cares as needed pending clinical course      Gross hematuria: Concern for urinary  retention  Mother reports ongoing gross hematuria for which patient has not sought medical care.   Has lower abdominal tenderness on exam and inability to urinate  -- bladder scan, straight cath vs place wise if needed  -- check UA and CT A/P  -- consult urology pending above results       HTN:   -- resume PTA amlodipine      GERD: resume home PPI      Mood disorder:   -- currently off his home trazodone and lexapro  -- continue to hold for now           Diet: Regular Diet Adult    DVT Prophylaxis: Pneumatic Compression Devices  Wise Catheter: Not present  Lines: None     Cardiac Monitoring: None  Code Status: Full Code      Clinically Significant Risk Factors Present on Admission         # Hyponatremia: Lowest Na = 134 mmol/L in last 2 days, will monitor as appropriate  # Hypochloremia: Lowest Cl = 96 mmol/L in last 2 days, will monitor as appropriate                        # Financial/Environmental Concerns: unemployed         Disposition Plan      Expected Discharge Date: 01/23/2025      Destination: home with family       Medically Ready for Discharge: Anticipated in 2-4 Days      Georges Musa DO  Hospitalist Service  [facility]  Securely message with [facility] (more info)  Text page via [employer] Paging/Directory     ______________________________________________________________________    Chief Complaint   Encephalopathy     History is obtained from the patient    History of Present Illness   Carlos Ivy is a 59 year old male who presents with concern for unresponsive episode this AM. Mother endorses several days of poor po intake and lack of normal daily alcohol intake  Last drink probably a few days ago. Patient denies IDU  Patient unable to recall events of last night, this AM or today  Mother also endorses recent gross hematuria that has been ongoing and patient has not sought evaluation    Past Medical History    Past Medical History:   Diagnosis Date    Alcohol abuse      Anxiety     Back pain, chronic     Depression     Hypertension     Substance abuse (H)        Past Surgical History   Past Surgical History:   Procedure Laterality Date    EXTRACTION(S) DENTAL      TONSILLECTOMY         Prior to Admission Medications   None           Physical Exam   Vital Signs: Temp: 98.3  F (36.8  C)   BP: (!) 155/102 Pulse: 94   Resp: 18 SpO2: 99 % O2 Device: None (Room air)    Weight: 180 lbs 0 oz    General Appearance: In no acute distress  RESPIRATORY: Clear to auscultation, no wheezes or rales  CARDIOVASCULAR: No le edema bilat.  ABDOMEN: soft and tender to palpation lower abdomen, without rebound  NEUROLOGIC: No focal arm or leg  weakness, speech is clear although patient is somnolent         Medical Decision Making       >70 MINUTES SPENT BY ME on the date of service doing chart review, history, exam, documentation & further activities per the note.      Data

## 2025-01-22 NOTE — ED TRIAGE NOTES
Patient comes from home where he lives with his mother--Patients mom saw him earlier in the day--an hour later she went to check on him and he would not wake up for her--Medics called and when they arrived patient was awake--Patient does drink daily--did drink this am--unsure when or how much. Blood sugar=117

## 2025-01-22 NOTE — MEDICATION SCRIBE - ADMISSION MEDICATION HISTORY
Medication Scribe Admission Medication History    Admission medication history is complete. The information provided in this note is only as accurate as the sources available at the time of the update.    Information Source(s): Family member via in-person    Pertinent Information: Spoke with patients mother, Nita, who was present at bedside. She reports that patient has not been taking his medications for quite a while. She mentions that she showed his prescription bottles to paramedics, but all of the bottles were full and . She had put them in a bag to bring to their pharmacy to dispose of. She did not bring in the bottles. At time of writing, registration is not complete, and no access to dispense history. Patient unable to be of assistance with medication history due to current state. Medications on file at [facility] are as follows:   -Trazodone 150 mg HS PRN sleeo  -Lexapro 20 mg QAM  -Omeprazole 20 mg QAM  -Meloxicam 7.5mg QAM  -Amlodipine 2.5 mg QAM  -Gabapentin 100 mg 1-3 caps TID   None were added PTA due to patients mother reporting patient not taking.      Changes made to PTA medication list:  Added: None  Deleted: None  Changed: None    Allergies reviewed with patient and updates made in EHR: yes    Medication History Completed By: Raghu Curran 2025 1:42 PM    No outpatient medications have been marked as taking for the 25 encounter (Hospital Encounter).

## 2025-01-22 NOTE — ED PROVIDER NOTES
Emergency Department Encounter     Evaluation Date & Time:   1/22/2025 10:19 AM    CHIEF COMPLAINT:  Altered Mental Status      Triage Note:       ED COURSE & MEDICAL DECISION MAKING:     Pt has alcohol use disorder, drinking again last night and not sleeping well.  Lives with mom who tried to wake him up this morning and not responding right away, so she called EMS.  Pt arrives awake/alert.  He does have occasional falls, including possibly yesterday.  He's otherwise neuro intact.  He is a little dry appearing/tachy.  Denies other drug use beyond cannabis. Will get labs, CT head/cspine given etoh with falls and recent mild confusion.  Will reassess after labs/imaging.  Likely related to etoh and early morning not waking easily rather than infectious or other serious pathology based on my initial exam/history.    ED Course as of 01/22/25 1446   Wed Jan 22, 2025   1130 Alcohol level negative. Likely some starvation/alcoholic ketosis based on chemistry.  Rest of labs overall reassuring.     1204 Ct head and Cspine neg for acute pathology.   1250 I spoke with pt's mom, who is here now.  Reports she went to check on him in his room around 0900 and he seemed awake, but eyes rolled back and twitching, not responding.  Uncertain if seizure.  Pt is a daily drinker and she was surprised to here etoh neg here.     1341 I spoke with hospitalist, Dr. Musa, who accepts for hospitalization.         Medical Decision Making    History:  Supplemental history from: EMS  External Record(s) reviewed: Documented in chart    Work Up:  Chart documentation includes differential considered and any EKGs or imaging independently interpreted by provider, where specified.  In additional to work up documented, I considered the following work up: Documented in chart, if applicable.    External consultation:  Discussion of management with another provider: Documented in chart, if applicable    Complicating factors:  Care impacted by chronic  illness: Chronic Pain and Hypertension  Care affected by social determinants of health: Alcohol Abuse and/or Recreational Drug Use    Disposition considerations: Admit.    Adult Minor Head Trauma:Drug or alcohol intoxication     At the conclusion of the encounter I discussed the results of all the tests and the disposition. The questions were answered. The patient or family acknowledged understanding and was agreeable with the care plan.      MEDICATIONS GIVEN IN THE EMERGENCY DEPARTMENT:  Medications   flumazenil (ROMAZICON) injection 0.2 mg (has no administration in time range)   diazepam (VALIUM) tablet 10 mg (10 mg Oral $Given 1/22/25 1425)   OLANZapine zydis (zyPREXA) ODT tab 5-10 mg (has no administration in time range)     Or   haloperidol lactate (HALDOL) injection 2.5-5 mg (has no administration in time range)   cloNIDine (CATAPRES) tablet 0.1 mg (0.1 mg Oral $Given 1/22/25 1426)   melatonin tablet 5 mg (has no administration in time range)   diazepam (VALIUM) tablet 10 mg (has no administration in time range)     Or   diazepam (VALIUM) injection 5-10 mg (has no administration in time range)   gabapentin (NEURONTIN) capsule 900 mg (has no administration in time range)   gabapentin (NEURONTIN) capsule 600 mg (has no administration in time range)   gabapentin (NEURONTIN) capsule 300 mg (has no administration in time range)   gabapentin (NEURONTIN) capsule 100 mg (has no administration in time range)   multivitamin w/minerals (THERA-VIT-M) tablet 1 tablet (1 tablet Oral $Given 1/22/25 1426)   thiamine (B-1) injection 100 mg (100 mg Intravenous $Given 1/22/25 1426)   folic acid injection 1 mg (1 mg Intravenous $Given 1/22/25 1426)   acetaminophen (TYLENOL) tablet 650 mg (has no administration in time range)     Or   acetaminophen (TYLENOL) Suppository 650 mg (has no administration in time range)   senna-docusate (SENOKOT-S/PERICOLACE) 8.6-50 MG per tablet 1 tablet (has no administration in time range)     Or    senna-docusate (SENOKOT-S/PERICOLACE) 8.6-50 MG per tablet 2 tablet (has no administration in time range)   polyethylene glycol (MIRALAX) Packet 17 g (has no administration in time range)   calcium carbonate (TUMS) chewable tablet 1,000 mg (has no administration in time range)   prochlorperazine (COMPAZINE) injection 10 mg (has no administration in time range)     Or   prochlorperazine (COMPAZINE) tablet 10 mg (has no administration in time range)   amLODIPine (NORVASC) tablet 2.5 mg (2.5 mg Oral $Given 1/22/25 1428)   sodium chloride 0.9% BOLUS 1,000 mL (0 mLs Intravenous Stopped 1/22/25 3717)   gabapentin (NEURONTIN) capsule 1,200 mg (1,200 mg Oral $Given 1/22/25 1972)       NEW PRESCRIPTIONS STARTED AT TODAY'S ED VISIT:  New Prescriptions    No medications on file       HPI   HPI     Carlos Ivy is a 59 year old male with a pertinent history of alcohol use disorder, anxiety, chronic low back pain, HTN who presents to this ED via EMS for evaluation of transient confusion.  Pt lives with mother, who was having a difficult time getting him to wake up, so she called EMS.  By the time EMS arrived, pt conversational.  Reports he didn't sleep much last night and up drinking per usual. Admits to cannabis use, but denies other illicit drugs over intentional overdose or SI.  Pt has chronic low back pain and does report occasional falls, including yesterday, uncertain if he hit head. Pt on no anticoagulation.  Denies new numbness/weakness, cp/sob, abd pain, recent n/v/d or cough.      REVIEW OF SYSTEMS:  See HPI      Medical History     Past Medical History:   Diagnosis Date    Alcohol abuse     Anxiety     Back pain, chronic     Depression     Hypertension     Substance abuse (H)        Past Surgical History:   Procedure Laterality Date    EXTRACTION(S) DENTAL      TONSILLECTOMY         No family history on file.    Social History     Tobacco Use    Smoking status: Every Day     Current packs/day: 1.00     Average  packs/day: 1 pack/day for 20.0 years (20.0 ttl pk-yrs)     Types: Cigarettes    Smokeless tobacco: Never   Substance Use Topics    Alcohol use: Yes     Comment: daily    Drug use: No     Types: Marijuana     Comment: occasional marijuana; occasional Vicodin (not prescribed)       No current outpatient medications on file.      Physical Exam     Vitals:  BP (!) 176/98   Pulse 96   Temp 98.3  F (36.8  C)   Resp 22   Ht 1.829 m (6')   Wt 81.6 kg (180 lb)   SpO2 99%   BMI 24.41 kg/m      PHYSICAL EXAM:   Physical Exam  Vitals and nursing note reviewed.   Constitutional:       General: He is not in acute distress.     Appearance: Normal appearance.   HENT:      Head: Normocephalic and atraumatic.      Nose: Nose normal.      Mouth/Throat:      Mouth: Mucous membranes are dry.   Eyes:      Pupils: Pupils are equal, round, and reactive to light.   Cardiovascular:      Rate and Rhythm: Regular rhythm. Tachycardia present.      Pulses: Normal pulses.           Radial pulses are 2+ on the right side and 2+ on the left side.        Dorsalis pedis pulses are 2+ on the right side and 2+ on the left side.   Pulmonary:      Effort: Pulmonary effort is normal. No respiratory distress.      Breath sounds: Rhonchi (mild b/l) present.   Abdominal:      Palpations: Abdomen is soft.      Tenderness: There is no abdominal tenderness.   Musculoskeletal:      Cervical back: Full passive range of motion without pain and neck supple.      Comments: No calf tenderness or swelling b/l   Skin:     General: Skin is warm.      Findings: No rash.   Neurological:      General: No focal deficit present.      Mental Status: He is alert and oriented to person, place, and time. Mental status is at baseline.      GCS: GCS eye subscore is 4. GCS verbal subscore is 5. GCS motor subscore is 6.      Sensory: Sensation is intact.      Motor: Motor function is intact.      Comments: Delayed, but fluent speech, no acute lateralizing deficits    Psychiatric:         Attention and Perception: He is inattentive.         Behavior: Behavior is slowed and withdrawn.         Thought Content: Thought content does not include suicidal ideation.           Results     LAB:  All pertinent labs reviewed and interpreted  Labs Ordered and Resulted from Time of ED Arrival to Time of ED Departure   BASIC METABOLIC PANEL - Abnormal       Result Value    Sodium 134 (*)     Potassium 3.4      Chloride 96 (*)     Carbon Dioxide (CO2) 20 (*)     Anion Gap 18 (*)     Urea Nitrogen 18.7      Creatinine 0.74      GFR Estimate >90      Calcium 9.7      Glucose 106 (*)    HEPATIC FUNCTION PANEL - Abnormal    Protein Total 6.9      Albumin 3.8      Bilirubin Total 1.3 (*)     Alkaline Phosphatase 62      AST 42      ALT 46      Bilirubin Direct 0.43 (*)    CBC WITH PLATELETS AND DIFFERENTIAL - Abnormal    WBC Count 10.0      RBC Count 4.25 (*)     Hemoglobin 13.7      Hematocrit 37.9 (*)     MCV 89      MCH 32.2      MCHC 36.1      RDW 13.5      Platelet Count 180      % Neutrophils 71      % Lymphocytes 19      % Monocytes 9      % Eosinophils 0      % Basophils 1      % Immature Granulocytes 1      NRBCs per 100 WBC 0      Absolute Neutrophils 7.1      Absolute Lymphocytes 1.9      Absolute Monocytes 0.9      Absolute Eosinophils 0.0      Absolute Basophils 0.1      Absolute Immature Granulocytes 0.1      Absolute NRBCs 0.0     MAGNESIUM - Normal    Magnesium 1.9     ETHYL ALCOHOL LEVEL - Normal    Alcohol ethyl <0.01     PHOSPHORUS - Normal    Phosphorus 3.1     URINE DRUG SCREEN PANEL       RADIOLOGY:  CT Cervical Spine w/o Contrast   Final Result   IMPRESSION: Mild degenerative anterolisthesis of C4 upon C5. Alignment is otherwise normal; however, there is straightening of normal cervical lordosis. Vertebral body heights normal. No fractures. Scattered facet arthropathy throughout the cervical    spine. No definite high-grade spinal canal stenosis although evaluation is mildly  limited by suboptimal imaging technique and artifact. No prevertebral soft tissue swelling.      CT Head w/o Contrast   Final Result   IMPRESSION:   1.  No CT evidence for acute intracranial process.   2.  Brain atrophy and presumed chronic microvascular ischemic changes as above.                      ECG:  Sinus tach, rate 105, occasional PVC, no acute ischemia    I have independently reviewed and interpreted the EKG(s) documented above     PROCEDURES:  Procedures:  none      FINAL IMPRESSION:    ICD-10-CM    1. Alcohol withdrawal seizure with complication (H)  F10.939     R56.9       2. Alcohol use disorder  F10.90       3. Alcoholic ketosis (H)  E88.89           0 minutes of critical care time      Nader Hylton DO  Emergency Medicine  [facility] EMERGENCY DEPARTMENT  1/22/2025  10:22 AM          Nader Hylton MD  01/22/25 5970

## 2025-01-22 NOTE — PROGRESS NOTES
Unable to give diazepam for CIWA score of 14 due to EEG being performed. Will administer as soon as it is completed.

## 2025-01-22 NOTE — PROGRESS NOTES
Patient admitted to room 9 at approximately 1555 via cart from emergency room.  Reason for Admission:   Report received from:   Patient was accompanied by Self.  Discharge transportation provided by:  Patient ambulated/transferred:  assist of 2. air [place].  Patient is alert and orientated x 1.  Outpatient Observation education provided to: (patient, family, friend)  MDRO Education done if applicable (MRSA, VRE, etc)  Safety risks were identified during admission:  fall and seizure.   Yellow risk/fall band applied:  Yes  Home meds sent home: No  Home meds sent to pharmacy:No IF YES add 1/2 sheet laminated page reminder to chart/clipboard   Detailed Belongings: pants, underwear, slippers

## 2025-01-23 VITALS
BODY MASS INDEX: 22.66 KG/M2 | HEIGHT: 72 IN | WEIGHT: 167.33 LBS | DIASTOLIC BLOOD PRESSURE: 51 MMHG | SYSTOLIC BLOOD PRESSURE: 94 MMHG | RESPIRATION RATE: 18 BRPM | OXYGEN SATURATION: 97 % | TEMPERATURE: 97.5 F | HEART RATE: 59 BPM

## 2025-01-23 PROBLEM — E87.29 HIGH ANION GAP METABOLIC ACIDOSIS: Status: ACTIVE | Noted: 2025-01-23

## 2025-01-23 PROBLEM — G92.9 TOXIC ENCEPHALOPATHY: Status: ACTIVE | Noted: 2025-01-23

## 2025-01-23 LAB
ALBUMIN SERPL BCG-MCNC: 3.4 G/DL (ref 3.5–5.2)
ALP SERPL-CCNC: 58 U/L (ref 40–150)
ALT SERPL W P-5'-P-CCNC: 42 U/L (ref 0–70)
ANION GAP SERPL CALCULATED.3IONS-SCNC: 16 MMOL/L (ref 7–15)
AST SERPL W P-5'-P-CCNC: 40 U/L (ref 0–45)
BILIRUB SERPL-MCNC: 1.2 MG/DL
BUN SERPL-MCNC: 18.8 MG/DL (ref 6–20)
CALCIUM SERPL-MCNC: 9.6 MG/DL (ref 8.8–10.4)
CHLORIDE SERPL-SCNC: 99 MMOL/L (ref 98–107)
CREAT SERPL-MCNC: 0.72 MG/DL (ref 0.67–1.17)
EGFRCR SERPLBLD CKD-EPI 2021: >90 ML/MIN/1.73M2
ERYTHROCYTE [DISTWIDTH] IN BLOOD BY AUTOMATED COUNT: 13.9 % (ref 10–15)
GLUCOSE SERPL-MCNC: 93 MG/DL (ref 70–99)
HCO3 SERPL-SCNC: 19 MMOL/L (ref 22–29)
HCT VFR BLD AUTO: 39.2 % (ref 40–53)
HGB BLD-MCNC: 13.5 G/DL (ref 13.3–17.7)
HOLD SPECIMEN: NORMAL
LACTATE SERPL-SCNC: 0.8 MMOL/L (ref 0.7–2)
MAGNESIUM SERPL-MCNC: 2.3 MG/DL (ref 1.7–2.3)
MCH RBC QN AUTO: 32.1 PG (ref 26.5–33)
MCHC RBC AUTO-ENTMCNC: 34.4 G/DL (ref 31.5–36.5)
MCV RBC AUTO: 93 FL (ref 78–100)
PLATELET # BLD AUTO: 185 10E3/UL (ref 150–450)
POTASSIUM SERPL-SCNC: 3.7 MMOL/L (ref 3.4–5.3)
POTASSIUM SERPL-SCNC: 4.3 MMOL/L (ref 3.4–5.3)
PROT SERPL-MCNC: 6.7 G/DL (ref 6.4–8.3)
RBC # BLD AUTO: 4.2 10E6/UL (ref 4.4–5.9)
SODIUM SERPL-SCNC: 134 MMOL/L (ref 135–145)
WBC # BLD AUTO: 9 10E3/UL (ref 4–11)

## 2025-01-23 PROCEDURE — 85048 AUTOMATED LEUKOCYTE COUNT: CPT

## 2025-01-23 PROCEDURE — 82565 ASSAY OF CREATININE: CPT | Performed by: INTERNAL MEDICINE

## 2025-01-23 PROCEDURE — 250N000011 HC RX IP 250 OP 636: Performed by: INTERNAL MEDICINE

## 2025-01-23 PROCEDURE — 258N000003 HC RX IP 258 OP 636

## 2025-01-23 PROCEDURE — 99232 SBSQ HOSP IP/OBS MODERATE 35: CPT | Mod: FS | Performed by: INTERNAL MEDICINE

## 2025-01-23 PROCEDURE — 250N000013 HC RX MED GY IP 250 OP 250 PS 637: Performed by: INTERNAL MEDICINE

## 2025-01-23 PROCEDURE — 96375 TX/PRO/DX INJ NEW DRUG ADDON: CPT

## 2025-01-23 PROCEDURE — 83735 ASSAY OF MAGNESIUM: CPT | Performed by: INTERNAL MEDICINE

## 2025-01-23 PROCEDURE — 83605 ASSAY OF LACTIC ACID: CPT

## 2025-01-23 PROCEDURE — 84132 ASSAY OF SERUM POTASSIUM: CPT | Performed by: INTERNAL MEDICINE

## 2025-01-23 PROCEDURE — G0378 HOSPITAL OBSERVATION PER HR: HCPCS

## 2025-01-23 PROCEDURE — 36415 COLL VENOUS BLD VENIPUNCTURE: CPT | Performed by: INTERNAL MEDICINE

## 2025-01-23 PROCEDURE — 82040 ASSAY OF SERUM ALBUMIN: CPT | Performed by: INTERNAL MEDICINE

## 2025-01-23 PROCEDURE — 96376 TX/PRO/DX INJ SAME DRUG ADON: CPT

## 2025-01-23 PROCEDURE — 85014 HEMATOCRIT: CPT

## 2025-01-23 PROCEDURE — 99207 PR APP CREDIT; MD BILLING SHARED VISIT: CPT | Mod: FS

## 2025-01-23 PROCEDURE — 120N000001 HC R&B MED SURG/OB

## 2025-01-23 PROCEDURE — 258N000001 HC RX 258: Performed by: INTERNAL MEDICINE

## 2025-01-23 PROCEDURE — 36415 COLL VENOUS BLD VENIPUNCTURE: CPT

## 2025-01-23 RX ORDER — POTASSIUM CHLORIDE 7.45 MG/ML
10 INJECTION INTRAVENOUS
Status: COMPLETED | OUTPATIENT
Start: 2025-01-23 | End: 2025-01-23

## 2025-01-23 RX ORDER — POTASSIUM CHLORIDE 7.45 MG/ML
10 INJECTION INTRAVENOUS
Status: DISCONTINUED | OUTPATIENT
Start: 2025-01-23 | End: 2025-01-23

## 2025-01-23 RX ADMIN — POTASSIUM CHLORIDE 10 MEQ: 7.46 INJECTION, SOLUTION INTRAVENOUS at 04:38

## 2025-01-23 RX ADMIN — FOLIC ACID 1 MG: 5 INJECTION, SOLUTION INTRAMUSCULAR; INTRAVENOUS; SUBCUTANEOUS at 08:36

## 2025-01-23 RX ADMIN — THIAMINE HYDROCHLORIDE 500 MG: 100 INJECTION, SOLUTION INTRAMUSCULAR; INTRAVENOUS at 13:59

## 2025-01-23 RX ADMIN — GABAPENTIN 900 MG: 300 CAPSULE ORAL at 21:32

## 2025-01-23 RX ADMIN — ACETAMINOPHEN 650 MG: 325 TABLET ORAL at 23:53

## 2025-01-23 RX ADMIN — POTASSIUM CHLORIDE, DEXTROSE MONOHYDRATE AND SODIUM CHLORIDE: 150; 5; 900 INJECTION, SOLUTION INTRAVENOUS at 08:37

## 2025-01-23 RX ADMIN — SODIUM CHLORIDE 500 ML: 9 INJECTION, SOLUTION INTRAVENOUS at 14:27

## 2025-01-23 RX ADMIN — Medication 1 TABLET: at 08:36

## 2025-01-23 RX ADMIN — POTASSIUM CHLORIDE 10 MEQ: 7.46 INJECTION, SOLUTION INTRAVENOUS at 03:30

## 2025-01-23 RX ADMIN — THIAMINE HYDROCHLORIDE 500 MG: 100 INJECTION, SOLUTION INTRAMUSCULAR; INTRAVENOUS at 08:35

## 2025-01-23 RX ADMIN — POTASSIUM CHLORIDE 10 MEQ: 7.46 INJECTION, SOLUTION INTRAVENOUS at 05:51

## 2025-01-23 RX ADMIN — CLONIDINE HYDROCHLORIDE 0.1 MG: 0.1 TABLET ORAL at 05:58

## 2025-01-23 RX ADMIN — THIAMINE HYDROCHLORIDE 500 MG: 100 INJECTION, SOLUTION INTRAMUSCULAR; INTRAVENOUS at 19:44

## 2025-01-23 RX ADMIN — SODIUM CHLORIDE 500 ML: 9 INJECTION, SOLUTION INTRAVENOUS at 11:53

## 2025-01-23 RX ADMIN — POTASSIUM CHLORIDE, DEXTROSE MONOHYDRATE AND SODIUM CHLORIDE: 150; 5; 900 INJECTION, SOLUTION INTRAVENOUS at 21:31

## 2025-01-23 RX ADMIN — POTASSIUM CHLORIDE 10 MEQ: 7.46 INJECTION, SOLUTION INTRAVENOUS at 02:24

## 2025-01-23 RX ADMIN — GABAPENTIN 900 MG: 300 CAPSULE ORAL at 14:00

## 2025-01-23 RX ADMIN — PANTOPRAZOLE SODIUM 20 MG: 20 TABLET, DELAYED RELEASE ORAL at 08:36

## 2025-01-23 ASSESSMENT — ACTIVITIES OF DAILY LIVING (ADL)
ADLS_ACUITY_SCORE: 39
ADLS_ACUITY_SCORE: 42
ADLS_ACUITY_SCORE: 39
ADLS_ACUITY_SCORE: 42
ADLS_ACUITY_SCORE: 29
ADLS_ACUITY_SCORE: 39
ADLS_ACUITY_SCORE: 42
ADLS_ACUITY_SCORE: 39
ADLS_ACUITY_SCORE: 42
ADLS_ACUITY_SCORE: 39
ADLS_ACUITY_SCORE: 29
ADLS_ACUITY_SCORE: 39
ADLS_ACUITY_SCORE: 42
ADLS_ACUITY_SCORE: 42

## 2025-01-23 NOTE — CARE PLAN
PRIMARY DIAGNOSIS: GENERALIZED WEAKNESS    OUTPATIENT/OBSERVATION GOALS TO BE MET BEFORE DISCHARGE  1. Orthostatic performed: No    2. Tolerating PO medications: Yes    3. Return to near baseline physical activity: No    4. Cleared for discharge by consultants (if involved): No    Discharge Planner Nurse   Safe discharge environment identified: No  Barriers to discharge: Yes       Entered by: Julienne Degroot RN 01/23/2025 9:16 AM     Please review provider order for any additional goals.   Nurse to notify provider when observation goals have been met and patient is ready for discharge.    Pt is A&O to self only, wakes to gentle touch. Denies N/V/D, SOB and dizziness. Denies N/T in hands and feet. BS was 298 mL. Sister bedside.

## 2025-01-23 NOTE — CARE PLAN
PRIMARY Concern: Alcohol withdrawal   SAFETY RISK Concerns (fall risk, behaviors, etc.): fall risk      Isolation/Type: none  Tests/Procedures for NEXT shift: TBD  Consults? (Pending/following, signed-off?) neurology  Where is patient from? (Home, TCU, etc.): home  Other Important info for NEXT shift: ETOH, CIWA q 2  Anticipated DC date & active delays: TBD  _________________________________________________  Orientation/Cognitive: orientated to self only  Observation Goals (Met/ Not Met): not met  Mobility Level/Assist Equipment: A2 GB/W  Antibiotics & Plan (IV/po, length of tx left): none  Pain Management: denies  Tele/VS/O2: VSS, RA, NSR with intermittent dysrhythmia   ABNL Lab/BG: K+ 3.3, 3.7  Diet: regular  Bowel/Bladder: unable to void, , 399, 404, 463 mL  Skin Concerns: dry  Drains/Devices: PIV x2, one SL, one infusing.

## 2025-01-23 NOTE — PLAN OF CARE
Goal Outcome Evaluation:      Plan of Care Reviewed With: patient, family    Overall Patient Progress: no changeOverall Patient Progress: no change     Pt is orientated to self, denies pain, N/V/D, SOB and dizziness. Tolerating PO intake and medication, IV fluids running.  mL, provider notified

## 2025-01-23 NOTE — PLAN OF CARE
Problem: Adult Inpatient Plan of Care  Goal: Absence of Hospital-Acquired Illness or Injury  Intervention: Prevent Skin Injury  Recent Flowsheet Documentation  Taken 1/22/2025 1613 by Chaparrita Yanes RN  Body Position:   position changed independently   supine, head elevated     Problem: Adult Inpatient Plan of Care  Goal: Optimal Comfort and Wellbeing  Outcome: Progressing   Goal Outcome Evaluation:       Pt alert to self and place, not time or situation. EEG and CT scan completed. BP was elevated, hydralazine given with effect. Pt on RA and denies pain. CIWA scores 13, 7, and 3. Pt given 5mg IV diazepam for score of 13, had been given 5mg diazepam in ED before coming to unit. Pt has been snoring and sleeping very deeply since 2nd dose of diazepam given at 1753 so scheduled diazepam and clonidine not given. Pt retaining over 600mL urine when bladder scanned, straight cathed for 600mL. Urine sample came back positive for cannabis.

## 2025-01-23 NOTE — UTILIZATION REVIEW
Admission Status; Secondary Review Determination   Under the authority of the Utilization Management Committee, the utilization review process indicated a secondary review on Carlos Ivy. The review outcome is based on review of the medical records, discussions with staff, and applying clinical experience noted on the date of the review.   (x) Inpatient Status Appropriate - This patient's medical care is consistent with medical management for inpatient care and reasonable inpatient medical practice.     RATIONALE FOR DETERMINATION   59-year-old male with daily alcohol use and history of withdrawal and mood disorder admitted after an unresponsive episode and also reporting gross hematuria.  Diagnostic workup not yet revealing etiology.  Started on CIWA protocol with high scores overnight and still scoring 5-6.  This morning is very drowsy and somnolent and more confused.  Urine tox shows cannabis.  Now today has been hypotensive with multiple blood pressures in the 80s and being given IV fluids and checking lactate.  Also has persistent anion gap metabolic acidosis.  Had urinary retention requiring straight cath x 1 for 700 cc.  Continue to monitor.    At the time of admission with the information available to the attending physician more than 2 nights Hospital complex care was anticipated, based on patient risk of adverse outcome if treated as outpatient and complex care required. Inpatient admission is appropriate based on the Medicare guidelines.   The information on this document is developed by the utilization review team in order for the business office to ensure compliance. This only denotes the appropriateness of proper admission status and does not reflect the quality of care rendered.   The definitions of Inpatient Status and Observation Status used in making the determination above are those provided in the CMS Coverage Manual, Chapter 1 and Chapter 6, section 70.4.   Sincerely,   Carlos Enrique Ovalle,  MD  Utilization Review  Physician Advisor  [facility]

## 2025-01-23 NOTE — PLAN OF CARE
"PRIMARY DIAGNOSIS: \"GENERIC\" NURSING  OUTPATIENT/OBSERVATION GOALS TO BE MET BEFORE DISCHARGE:  ADLs back to baseline: No    Activity and level of assistance: sleepy all shift, did not get out of bed    Pain status: Pain free.    Return to near baseline physical activity: No     Discharge Planner Nurse   Safe discharge environment identified: Yes  Barriers to discharge: Yes       Entered by: Anabell Stacy RN 01/23/2025 6:41 AM     Please review provider order for any additional goals.   Nurse to notify provider when observation goals have been met and patient is ready for discharge.Goal Outcome Evaluation:                        "

## 2025-01-23 NOTE — PROGRESS NOTES
[facility]    Medicine Progress Note - Hospitalist Service    Date of Admission:  1/22/2025    Assessment & Plan     Carlos Ivy is a 59 year old male with history of daily alcohol use, history of withdrawal, history of mood disorder and HTN who is not taking his chronic medications and doesn't seek regular medical care is admitted on 1/22/2025 after an unresponsive episode and reports of gross hematuria.     Toxic encephalopathy  Unresponsive episode  Alcohol abuse  -Mother endorses several days of poor po intake and lack of normal daily alcohol intake, last drink probably a few days ago. Patient denies IDU  -Ethanol level on admission negative  -Patient unable to recall events  -Unsure if he fell or hit his head  -Head CT and neck negative  -EEG-negative for seizure activity. Shows diffusely slow background in theta and delta range that suggests a nonspecific generalized cerebral dysfunction. Can be seen with metabolic or toxic abnormalities.   -CIWA protocol-was scoring high overnight. 5-6 today 1/23/25  -Scheduled diazepam stopped. Patient extremely drowsy and difficult to arouse  -More confused from baseline per sister's report  -Urine drug screen positive for cannabis   -Ammonia-12  -IVF  -Place on high dose thiamine given risk of Wernicke's  -Folic acid, multivitamin    Hypotension  -SBP-80's x2  -500 ml fluid bolus  -Check lactic-0.8    Hypokalemia  -Potassium-3.4-->3.3-->3.7-->4.3  -Replace per protocol    Anion Gap Metabolic Acidosis  -Likely starvation ketosis due to poor po intake last several days  -Dextrose containing IVF  -Trend BMP  -Hc03 not low enough to warrant bicarb drip for now    Mild hyponatremia: suspect beer potomania  -Monitor for improvement with infusion of isotonic IVF  -Sodium-134    Elevated bilirubin  -suspect related to Etoh abuse  -- trend    Chronic neck pain:  -CT C-Spine on admission shows mild degenerative anterolisthesis of C4 upon C5   -Has not been  taking home gabapentin or meloxicam PTA  -Gabapentin dosing as part of [facility] protocol for now    Gross hematuria  -Concern for urinary retention  -Mother reports ongoing gross hematuria for which patient has not sought medical care.   -Bladder scanned and straight cathed x 1 for 700 ml overnight  -UA negative for blood  -CT abdomen-negative    HTN  -PTA amlodipine    GERD:  -PTA PPI    Mood disorder  -Currently off his home trazodone and lexapro  -Continue to hold for now        Diet: Regular Diet Adult    DVT Prophylaxis: Pneumatic Compression Devices  Quiles Catheter: Not present  Lines: None     Cardiac Monitoring: ACTIVE order. Indication: QTc prolonging medication (48 hours)  Code Status: Full Code      Clinically Significant Risk Factors Present on Admission        # Hypokalemia: Lowest K = 3.3 mmol/L in last 2 days, will replace as needed  # Hyponatremia: Lowest Na = 134 mmol/L in last 2 days, will monitor as appropriate  # Hypochloremia: Lowest Cl = 96 mmol/L in last 2 days, will monitor as appropriate      # Hypoalbuminemia: Lowest albumin = 3.4 g/dL at 1/23/2025  6:16 AM, will monitor as appropriate                   # Financial/Environmental Concerns: unemployed         Social Drivers of Health    Food Insecurity: Unknown (1/22/2025)    Food Insecurity     Within the past 12 months, did you worry that your food would run out before you got money to buy more?: Patient unable to answer     Within the past 12 months, did the food you bought just not last and you didn t have money to get more?: Patient unable to answer   Depression: At risk (11/1/2022)    Received from [facility]    PHQ-2     PHQ-2 TOTAL SCORE: 4   Tobacco Use: High Risk (2/29/2024)    Received from [facility]    Patient History     Smoking Tobacco Use: Every Day     Smokeless Tobacco Use: Never   Financial Resource Strain: Unknown (1/22/2025)    Financial Resource Strain      Within the past 12 months, have you or your family members you live with been unable to get utilities (heat, electricity) when it was really needed?: Patient unable to answer   Transportation Needs: Unknown (1/22/2025)    Transportation Needs     Within the past 12 months, has lack of transportation kept you from medical appointments, getting your medicines, non-medical meetings or appointments, work, or from getting things that you need?: Patient unable to answer    Received from [facility]    Social Connections          Disposition Plan     Medically Ready for Discharge: Anticipated Tomorrow         The patient's care was discussed with the Attending Physician, Dr. Rubio, Patient, and Patient's Family.    Michelle Bauman NP  Hospitalist Service  [facility]  Securely message with [employer] (more info)  Text page via [employer] Paging/Directory   ______________________________________________________________________    Interval History     Physical Exam   Vital Signs: Temp: 97.5  F (36.4  C) Temp src: Oral BP: 96/62 Pulse: 67   Resp: 18 SpO2: 99 % O2 Device: None (Room air)    Weight: 167 lbs 5.27 oz    Constitutional: awake, alert, slight confusion, cooperative, and no apparent distress  Respiratory: No increased work of breathing, good air exchange, clear to auscultation bilaterally, no crackles or wheezing  Cardiovascular: Normal apical impulse, regular rate and rhythm, normal S1 and S2, no S3 or S4, and no murmur noted  GI: No scars, normal bowel sounds, soft, non-distended, non-tender, no masses palpated, no hepatosplenomegally    Medical Decision Making       45 MINUTES SPENT BY ME on the date of service doing chart review, history, exam, documentation & further activities per the note.      Data     I have personally reviewed the following data over the past 24 hrs:    10.0  \   13.7   / 180     134 (L) 99 18.8 /  93   4.3 19 (L) 0.72 \     ALT: 42 AST: 40  AP: 58 TBILI: 1.2   ALB: 3.4 (L) TOT PROTEIN: 6.7 LIPASE: 30       Imaging results reviewed over the past 24 hrs:   Recent Results (from the past 24 hours)   CT Head w/o Contrast    Narrative    EXAM: CT HEAD W/O CONTRAST  LOCATION: [facility]  DATE: 1/22/2025    INDICATION: Transient confusion, alcoholic, recent falls.    COMPARISON: Head CT 7/8/2020.    TECHNIQUE: Routine CT head without intravenous contrast. Multiplanar reformats. Dose reduction techniques were used.    FINDINGS:  INTRACRANIAL CONTENTS: No intracranial hemorrhage, extra-axial collection, or mass effect. No CT evidence of acute infarct. Moderate presumed chronic small vessel ischemic changes. Moderate generalized volume loss. No hydrocephalus.     VISUALIZED ORBITS/SINUSES/MASTOIDS: No intraorbital abnormality. No paranasal sinus mucosal disease. No middle ear or mastoid effusion.    BONES/SOFT TISSUES: No acute abnormality.      Impression    IMPRESSION:  1.  No CT evidence for acute intracranial process.  2.  Brain atrophy and presumed chronic microvascular ischemic changes as above.     CT Cervical Spine w/o Contrast    Narrative    EXAM: CT CERVICAL SPINE W/O CONTRAST  LOCATION: [facility]  DATE: 1/22/2025    INDICATION: Recent falls, alcoholic.  COMPARISON: None.  TECHNIQUE: Routine CT Cervical Spine without IV contrast. Multiplanar reformats. Dose reduction techniques were used.      Impression    IMPRESSION: Mild degenerative anterolisthesis of C4 upon C5. Alignment is otherwise normal; however, there is straightening of normal cervical lordosis. Vertebral body heights normal. No fractures. Scattered facet arthropathy throughout the cervical   spine. No definite high-grade spinal canal stenosis although evaluation is mildly limited by suboptimal imaging technique and artifact. No prevertebral soft tissue swelling.   CT Abdomen Pelvis w Contrast    Narrative    EXAM: CT ABDOMEN PELVIS W  CONTRAST  LOCATION: [facility]  DATE: 1/22/2025    INDICATION: Assessment for gross hematuria  COMPARISON: 7/8/2020  TECHNIQUE: CT scan of the abdomen and pelvis was performed following injection of IV contrast. Multiplanar reformats were obtained. Dose reduction techniques were used.  CONTRAST: 90ml isovue 370    FINDINGS:   LOWER CHEST: Normal.    HEPATOBILIARY: Diffuse hepatic steatosis. No significant mass. No bile duct dilatation. No calcified gallstones.    PANCREAS: No significant mass, duct dilatation, or inflammatory change.    SPLEEN: Normal size.    ADRENAL GLANDS: No significant nodules.    KIDNEYS/BLADDER: The bilateral kidneys enhance symmetrically without evidence for hydronephrosis or pyelonephritis. No renal masses or calculi noted. The bilateral ureters and urinary bladder are unremarkable.    BOWEL: Nonspecific nonobstructive bowel gas pattern. Appendix within normal limits.    LYMPH NODES: No lymphadenopathy.    VASCULATURE: No abdominal aortic aneurysm. Moderate vascular calcifications abdominal aorta with mild vascular calcifications common iliac arteries.    PELVIC ORGANS: No pelvic mass. No pelvic free fluid.    MUSCULOSKELETAL: Marked interval compression deformity of T11. Mild to moderate spondylosis thoracic and lumbar spines. Degenerative disc disease at the L5-S1 interspace. No inguinal hernias. No ventral hernia.      Impression    IMPRESSION:   1.  No CT explanation for the hematuria. No urinary calculi or hydronephrosis.  2.  Diffuse hepatic steatosis.  3.  Marked interval compression deformity of T11.

## 2025-01-23 NOTE — PROGRESS NOTES
Spoke with Dr. Rodriguez about 8pm scheduled Diazepam due to pt receiving 2 doses of 5mg IV diazepam due to CIWA scores and now being very sedated.    Provider advised holding 8pm dose.

## 2025-01-23 NOTE — CARE PLAN
PRIMARY DIAGNOSIS: GENERALIZED WEAKNESS    OUTPATIENT/OBSERVATION GOALS TO BE MET BEFORE DISCHARGE  1. Orthostatic performed: No    2. Tolerating PO medications: Yes    3. Return to near baseline physical activity: No    4. Cleared for discharge by consultants (if involved): No    Discharge Planner Nurse   Safe discharge environment identified: No  Barriers to discharge: Yes       Entered by: Julienne Degroot RN 01/23/2025 9:17 AM     Please review provider order for any additional goals.   Nurse to notify provider when observation goals have been met and patient is ready for discharge.    Pt is A&O to self only, wakes to gentle touch. Denies N/V/D, SOB and dizziness. Denies N/T in hands and feet. BS was 399 mL, asymptomatic, will re-scan q 2 hr.

## 2025-01-23 NOTE — PLAN OF CARE
"  Problem: Fatigue  Goal: Improved Activity Tolerance  Outcome: Not Progressing  Intervention: Promote Improved Energy  Recent Flowsheet Documentation  Taken 1/23/2025 0600 by Anabell Stacy RN  Activity Management: activity adjusted per tolerance  Taken 1/23/2025 0000 by Anabell Stacy RN  Activity Management: activity adjusted per tolerance     Problem: Adult Inpatient Plan of Care  Goal: Patient-Specific Goal (Individualized)  Description: You can add care plan individualizations to a care plan. Examples of Individualization might be:  \"Parent requests to be called daily at 9am for status\", \"I have a hard time hearing out of my right ear\", or \"Do not touch me to wake me up as it startles  me\".  Outcome: Progressing  Goal: Optimal Comfort and Wellbeing  Outcome: Progressing  Goal: Readiness for Transition of Care  Outcome: Progressing     Problem: Skin Injury Risk Increased  Goal: Skin Health and Integrity  Outcome: Progressing  Intervention: Plan: Nurse Driven Intervention: Moisture Management  Recent Flowsheet Documentation  Taken 1/23/2025 0600 by Anabell Stacy RN  Moisture Interventions: Encourage regular toileting  Bathing/Skin Care:   patient refused   linen changed  Taken 1/23/2025 0000 by Anabell Stacy RN  Moisture Interventions: Encourage regular toileting  Bathing/Skin Care:   patient refused   linen changed  Intervention: Optimize Skin Protection  Recent Flowsheet Documentation  Taken 1/23/2025 0600 by Anabell Stacy RN  Activity Management: activity adjusted per tolerance  Head of Bed (HOB) Positioning: HOB at 20-30 degrees  Taken 1/23/2025 0000 by Anabell Stacy RN  Activity Management: activity adjusted per tolerance  Head of Bed (HOB) Positioning: HOB at 20-30 degrees  Pt is alert to self, extremely lethargic, called House officer, Pt was difficult to wake and pt could not stay awake to swallow meds. Pt's CIWA core was 5, 6. VSS, on RA. On K protocol, R,L " PIV infusing IVF@ 75 mL/hr . SBA, reg diet

## 2025-01-24 ENCOUNTER — APPOINTMENT (OUTPATIENT)
Dept: PHYSICAL THERAPY | Facility: HOSPITAL | Age: 60
End: 2025-01-24
Payer: COMMERCIAL

## 2025-01-24 LAB
ANION GAP SERPL CALCULATED.3IONS-SCNC: 8 MMOL/L (ref 7–15)
ATRIAL RATE - MUSE: 105 BPM
BUN SERPL-MCNC: 14.5 MG/DL (ref 6–20)
CALCIUM SERPL-MCNC: 9 MG/DL (ref 8.8–10.4)
CHLORIDE SERPL-SCNC: 111 MMOL/L (ref 98–107)
CREAT SERPL-MCNC: 0.76 MG/DL (ref 0.67–1.17)
DIASTOLIC BLOOD PRESSURE - MUSE: 118 MMHG
EGFRCR SERPLBLD CKD-EPI 2021: >90 ML/MIN/1.73M2
ERYTHROCYTE [DISTWIDTH] IN BLOOD BY AUTOMATED COUNT: 14.1 % (ref 10–15)
GLUCOSE SERPL-MCNC: 127 MG/DL (ref 70–99)
HCO3 SERPL-SCNC: 17 MMOL/L (ref 22–29)
HCT VFR BLD AUTO: 30 % (ref 40–53)
HGB BLD-MCNC: 10.4 G/DL (ref 13.3–17.7)
INTERPRETATION ECG - MUSE: NORMAL
MCH RBC QN AUTO: 32.3 PG (ref 26.5–33)
MCHC RBC AUTO-ENTMCNC: 34.7 G/DL (ref 31.5–36.5)
MCV RBC AUTO: 93 FL (ref 78–100)
P AXIS - MUSE: 82 DEGREES
PLATELET # BLD AUTO: 148 10E3/UL (ref 150–450)
POTASSIUM SERPL-SCNC: 3.5 MMOL/L (ref 3.4–5.3)
PR INTERVAL - MUSE: 170 MS
QRS DURATION - MUSE: 78 MS
QT - MUSE: 374 MS
QTC - MUSE: 494 MS
R AXIS - MUSE: 231 DEGREES
RBC # BLD AUTO: 3.22 10E6/UL (ref 4.4–5.9)
SODIUM SERPL-SCNC: 136 MMOL/L (ref 135–145)
SYSTOLIC BLOOD PRESSURE - MUSE: 189 MMHG
T AXIS - MUSE: 57 DEGREES
VENTRICULAR RATE- MUSE: 105 BPM
WBC # BLD AUTO: 6.4 10E3/UL (ref 4–11)

## 2025-01-24 PROCEDURE — 85018 HEMOGLOBIN: CPT

## 2025-01-24 PROCEDURE — 250N000013 HC RX MED GY IP 250 OP 250 PS 637: Performed by: INTERNAL MEDICINE

## 2025-01-24 PROCEDURE — 97530 THERAPEUTIC ACTIVITIES: CPT | Mod: GP

## 2025-01-24 PROCEDURE — 250N000013 HC RX MED GY IP 250 OP 250 PS 637

## 2025-01-24 PROCEDURE — 80048 BASIC METABOLIC PNL TOTAL CA: CPT

## 2025-01-24 PROCEDURE — 36415 COLL VENOUS BLD VENIPUNCTURE: CPT

## 2025-01-24 PROCEDURE — 120N000001 HC R&B MED SURG/OB

## 2025-01-24 PROCEDURE — 85048 AUTOMATED LEUKOCYTE COUNT: CPT

## 2025-01-24 PROCEDURE — 99207 PR APP CREDIT; MD BILLING SHARED VISIT: CPT | Performed by: HOSPITALIST

## 2025-01-24 PROCEDURE — 250N000011 HC RX IP 250 OP 636: Performed by: INTERNAL MEDICINE

## 2025-01-24 PROCEDURE — 97162 PT EVAL MOD COMPLEX 30 MIN: CPT | Mod: GP

## 2025-01-24 PROCEDURE — 258N000001 HC RX 258: Performed by: INTERNAL MEDICINE

## 2025-01-24 PROCEDURE — 250N000013 HC RX MED GY IP 250 OP 250 PS 637: Performed by: HOSPITALIST

## 2025-01-24 PROCEDURE — 99232 SBSQ HOSP IP/OBS MODERATE 35: CPT

## 2025-01-24 RX ORDER — POTASSIUM CHLORIDE 1500 MG/1
20 TABLET, EXTENDED RELEASE ORAL ONCE
Status: COMPLETED | OUTPATIENT
Start: 2025-01-24 | End: 2025-01-24

## 2025-01-24 RX ORDER — TAMSULOSIN HYDROCHLORIDE 0.4 MG/1
0.4 CAPSULE ORAL DAILY
Status: DISCONTINUED | OUTPATIENT
Start: 2025-01-24 | End: 2025-01-28 | Stop reason: HOSPADM

## 2025-01-24 RX ORDER — POTASSIUM CHLORIDE 1.5 G/1.58G
20 POWDER, FOR SOLUTION ORAL ONCE
Status: COMPLETED | OUTPATIENT
Start: 2025-01-24 | End: 2025-01-24

## 2025-01-24 RX ADMIN — GABAPENTIN 900 MG: 300 CAPSULE ORAL at 22:48

## 2025-01-24 RX ADMIN — GABAPENTIN 900 MG: 300 CAPSULE ORAL at 13:19

## 2025-01-24 RX ADMIN — TAMSULOSIN HYDROCHLORIDE 0.4 MG: 0.4 CAPSULE ORAL at 13:18

## 2025-01-24 RX ADMIN — POTASSIUM CHLORIDE 20 MEQ: 1.5 POWDER, FOR SOLUTION ORAL at 06:37

## 2025-01-24 RX ADMIN — PANTOPRAZOLE SODIUM 20 MG: 20 TABLET, DELAYED RELEASE ORAL at 06:37

## 2025-01-24 RX ADMIN — GABAPENTIN 900 MG: 300 CAPSULE ORAL at 06:36

## 2025-01-24 RX ADMIN — Medication 1 TABLET: at 10:09

## 2025-01-24 RX ADMIN — THIAMINE HYDROCHLORIDE 500 MG: 100 INJECTION, SOLUTION INTRAMUSCULAR; INTRAVENOUS at 10:08

## 2025-01-24 RX ADMIN — THIAMINE HYDROCHLORIDE 500 MG: 100 INJECTION, SOLUTION INTRAMUSCULAR; INTRAVENOUS at 13:19

## 2025-01-24 RX ADMIN — FOLIC ACID 1 MG: 5 INJECTION, SOLUTION INTRAMUSCULAR; INTRAVENOUS; SUBCUTANEOUS at 10:09

## 2025-01-24 RX ADMIN — POTASSIUM CHLORIDE 20 MEQ: 1500 TABLET, EXTENDED RELEASE ORAL at 15:45

## 2025-01-24 RX ADMIN — POTASSIUM CHLORIDE, DEXTROSE MONOHYDRATE AND SODIUM CHLORIDE: 150; 5; 900 INJECTION, SOLUTION INTRAVENOUS at 13:31

## 2025-01-24 ASSESSMENT — ACTIVITIES OF DAILY LIVING (ADL)
ADLS_ACUITY_SCORE: 44
ADLS_ACUITY_SCORE: 43
ADLS_ACUITY_SCORE: 43
ADLS_ACUITY_SCORE: 42
ADLS_ACUITY_SCORE: 44
ADLS_ACUITY_SCORE: 44
ADLS_ACUITY_SCORE: 43
ADLS_ACUITY_SCORE: 43
ADLS_ACUITY_SCORE: 44
ADLS_ACUITY_SCORE: 42
ADLS_ACUITY_SCORE: 44
ADLS_ACUITY_SCORE: 42
ADLS_ACUITY_SCORE: 44
ADLS_ACUITY_SCORE: 42
ADLS_ACUITY_SCORE: 44
ADLS_ACUITY_SCORE: 44
ADLS_ACUITY_SCORE: 42
ADLS_ACUITY_SCORE: 43
ADLS_ACUITY_SCORE: 44
ADLS_ACUITY_SCORE: 44

## 2025-01-24 NOTE — PLAN OF CARE
Problem: Fatigue  Goal: Improved Activity Tolerance  Intervention: Promote Improved Energy  Recent Flowsheet Documentation  Taken 1/24/2025 1000 by Tabitha Mcdonald RN  Environmental Support: calm environment promoted     Problem: Skin Injury Risk Increased  Goal: Skin Health and Integrity  Intervention: Plan: Nurse Driven Intervention: Moisture Management  Recent Flowsheet Documentation  Taken 1/24/2025 0800 by Tabitha Mcdonald RN  Moisture Interventions: Incontinence pad  Bathing/Skin Care: bath, partial   Goal Outcome Evaluation:  Alert disoriented to time and situation up in a chair Orthostatic BP VSS family at bedside D5 0.9% Nacl + Kcl mEQ/l infusion at 75 ml /hr CIWA-Ar 1 wise with good output pt is transferring to P1 Room 127.

## 2025-01-24 NOTE — PROGRESS NOTES
Coudé catheter inserted, obtained urine return. Dr. Balwinder Huang paged via [employer] Messaging, indwelling Quiles catheter ordered for urine retention.

## 2025-01-24 NOTE — PROVIDER NOTIFICATION
Pt while in a chair fell asleep and family at bedside witnessed pt has jerky movements. VSS pt is alert and responsive provider notified.

## 2025-01-24 NOTE — PROGRESS NOTES
Attempted straight cath, unable to obtain urine return. Met with resistance, approximately 3 cm of blood at end of catheter tip when pulled back out.

## 2025-01-24 NOTE — PROGRESS NOTES
"CLINICAL NUTRITION SERVICES - ASSESSMENT NOTE    RECOMMENDATIONS FOR MDs/PROVIDERS TO ORDER:  Recommend continue MVI/M daily r/t inadequate oral intakes of fruits and vegetables     Malnutrition Status:    Patient does not meet two of the established criteria necessary for diagnosing malnutrition    Registered Dietitian Interventions:  Reviewed general healthy eating guidelines   Discontinue vs moderate caffeine and sugar     Future/Additional Recommendations:  Monitor po, weight, labs, I/Os.      REASON FOR ASSESSMENT  Positive admission nutrition risk screen    HPI: Pt with history of daily alcohol use, history of withdrawal, history of mood disorder and HTN who is not taking his chronic medications and doesn't seek regular medical care is admitted on 1/22/2025 after an unresponsive episode and reports of gross hematuria.     SUBJECTIVE INFORMATION  Assessed patient in room. Pt family present     NUTRITION HISTORY  Pt reports okay appetite and po intakes, reports eating 100% of breakfast this AM. Pt reports consuming at least x1 meal/day at baseline- family notes pt skips meals frequently and may go a few days without eating or eat very little. Pt family reports he does not eat vegetables and very minimal fruits at baseline. Pt reports drinking an estimated 6-8 beers/day, family states it is likely more than reported. Family reports pt has likely lost some weight, usual weight predicted ~180 lbs.     CURRENT NUTRITION ORDERS  Diet: Regular    CURRENT INTAKE/TOLERANCE  100% po at meals this admit   Poor oral intakes noted PTA     LABS  Nutrition-relevant labs: Reviewed     MEDICATIONS  Nutrition-relevant medications: Continuous IVF D5 and NaCl + Kcl 75 ml/hr   Scheduled folic acid, MVI/M, protonix, thiamin    ANTHROPOMETRICS  Height: 182.9 cm (6' 0\")  Most Recent Weight: 75.9 kg (167 lb 5.3 oz)  IBW: 80.9 kg  % IBW: 94%  BMI (kg/m ): Normal BMI  Weight History: 6.9% wt loss x11 months- not clinically significant "   Wt Readings from Last 10 Encounters:   01/22/25 75.9 kg (167 lb 5.3 oz)   07/17/20 75.3 kg (166 lb)   09/14/16 86.2 kg (190 lb)   05/17/16 90.9 kg (200 lb 8 oz)   02/25/16 88.5 kg (195 lb)   08/20/15 90.3 kg (199 lb)   03/12/15 84.8 kg (187 lb)   2/29/24  81.5 kg (179 lb 11.2 oz)     Dosing Weight: 75.9 kg, based on actual wt    ASSESSED NUTRITION NEEDS  Estimated Energy Needs: 9003-6091 kcals/day (25 - 30+ kcals/kg)  Justification: Increased needs  Estimated Protein Needs: 75-91 grams protein/day (1 - 1.2 grams of pro/kg)  Justification: Increased needs  Estimated Fluid Needs: 3602-6968 mL/day (25 - 30 mL/kg)  Justification: Maintenance    SYSTEM FINDINGS    BM: last noted PTA 1/20     MALNUTRITION  % Intake: Decreased intake does not meet criteria  % Weight Loss: Weight loss does not meet criteria   Subcutaneous Fat Loss: None observed  Muscle Loss: Shoulders (deltoids): Moderate  Fluid Accumulation/Edema: None noted  Malnutrition Diagnosis: Patient does not meet two of the established criteria necessary for diagnosing malnutrition  Malnutrition Present on Admission: N/A    NUTRITION DIAGNOSIS  Unintended weight loss related to inadequate types of oral intakes as evidenced by 6.9% wt loss x11 months    INTERVENTIONS  Encouraged x2-3 balanced meals/day, reviewed general healthy eating nutrition guidelines and recommendations   Recommended Moderate intake or avoid sugar and caffeine     Recommend continue MVI/M daily r/t inadequate oral intakes of fruits and vegetables     Goals  Meet >75% nutrition needs   Electrolytes WNL      Monitoring/Evaluation  Progress toward goals will be monitored and evaluated per policy.

## 2025-01-24 NOTE — PROGRESS NOTES
SWAT nurse paged for straight cath attempt x 2. Unable to advance catheter completely (likely obstructed by prostate). Per SWAT nurse, will need orders for Coudé catheter/indwelling Quiles.

## 2025-01-24 NOTE — CONSULTS
[facility] CONSULT     Type of Consult: inpatient   Place of Service:  [facility]  Reason for Consultation: Gross hematuria  Consult Requested by: Dr. Espino    History of Present Illness:    Carlos Ivy is a 59 year old male that is admitted for Toxic encephalopathy. Urology has been consulted due to blood in the urine. History obtained through patient,  and chart review.     Patient reports that he first noted the blood in the urine over a month ago. Patient reports the he has not seen clots and that they are having difficulty with voiding. Quiles catheter was plaed overnight de to elevated PVRs, over 450 ml x 2. The patient is not pain with urination.  The hematuria did not  start after catheter placement. Patient has not been worked up for hematuria previously. Patient reports denies history of previous cystoscopy. Patient denies had unwanted/unexplained weight loss. Patient endorses current or past smoking history Denies stones or UTI history.    Past Medical history  Past Medical History:   Diagnosis Date    Alcohol abuse     Anxiety     Back pain, chronic     Depression     Hypertension     Substance abuse (H)        Past Surgical history  Past Surgical History:   Procedure Laterality Date    EXTRACTION(S) DENTAL      TONSILLECTOMY         Social History  Social History     Tobacco Use    Smoking status: Every Day     Current packs/day: 1.00     Average packs/day: 1 pack/day for 20.0 years (20.0 ttl pk-yrs)     Types: Cigarettes    Smokeless tobacco: Never   Substance Use Topics    Alcohol use: Yes     Comment: daily    Drug use: No     Types: Marijuana     Comment: occasional marijuana; occasional Vicodin (not prescribed)       Medications  Current Facility-Administered Medications   Medication Dose Route Frequency Provider Last Rate Last Admin    acetaminophen (TYLENOL) tablet 650 mg  650 mg Oral Q4H PRN Georges Musa DO   650 mg at 01/23/25 5955    Or    acetaminophen (TYLENOL)  Suppository 650 mg  650 mg Rectal Q4H PRN Georges Musa, DO        amLODIPine (NORVASC) tablet 2.5 mg  2.5 mg Oral Daily Georges Musa, DO   2.5 mg at 01/22/25 1425    calcium carbonate (TUMS) chewable tablet 1,000 mg  1,000 mg Oral 4x Daily PRN Georges Musa DO        dextrose 5% and 0.9% NaCl + KCL 20 mEq/L infusion   Intravenous Continuous Georges Musa DO 75 mL/hr at 01/23/25 2131 New Bag at 01/23/25 2131    diazepam (VALIUM) tablet 10 mg  10 mg Oral Q30 Min PRN Georges Musa DO        Or    diazepam (VALIUM) injection 5-10 mg  5-10 mg Intravenous Q30 Min PRN Georges Musa DO   10 mg at 01/22/25 1753    flumazenil (ROMAZICON) injection 0.2 mg  0.2 mg Intravenous q1 min prn Nader Hylton MD        folic acid injection 1 mg  1 mg Intravenous Daily Georges Musa DO   1 mg at 01/23/25 0836    [START ON 1/29/2025] gabapentin (NEURONTIN) capsule 100 mg  100 mg Oral Q8H Georges Musa DO        [START ON 1/27/2025] gabapentin (NEURONTIN) capsule 300 mg  300 mg Oral Q8H Georges Musa, DO        [START ON 1/25/2025] gabapentin (NEURONTIN) capsule 600 mg  600 mg Oral Q8H Georges Musa, DO        gabapentin (NEURONTIN) capsule 900 mg  900 mg Oral Q8H Georges Musa, DO   900 mg at 01/24/25 0636    OLANZapine zydis (zyPREXA) ODT tab 5-10 mg  5-10 mg Oral Q6H PRN Georges Musa DO        Or    haloperidol lactate (HALDOL) injection 2.5-5 mg  2.5-5 mg Intravenous Q6H PRN Georges Musa,         hydrALAZINE (APRESOLINE) injection 10 mg  10 mg Intravenous Q4H PRN Georges Musa DO   10 mg at 01/22/25 1619    melatonin tablet 5 mg  5 mg Oral QPM PRN Georges Musa DO        multivitamin w/minerals (THERA-VIT-M) tablet 1 tablet  1 tablet Oral Daily Georges Musa DO   1 tablet at 01/23/25 0836    pantoprazole (PROTONIX) EC tablet 20 mg  20 mg Oral QAM AC Georges Musa DO   20 mg at 01/24/25 0637    polyethylene glycol (MIRALAX) Packet  17 g  17 g Oral BID PRN Rossini, Georges A, DO        prochlorperazine (COMPAZINE) injection 10 mg  10 mg Intravenous Q6H PRN Rossini, Georges A, DO        Or    prochlorperazine (COMPAZINE) tablet 10 mg  10 mg Oral Q6H PRN Rossini, Georges A, DO        senna-docusate (SENOKOT-S/PERICOLACE) 8.6-50 MG per tablet 1 tablet  1 tablet Oral BID PRN Rossini, Georges A, DO        Or    senna-docusate (SENOKOT-S/PERICOLACE) 8.6-50 MG per tablet 2 tablet  2 tablet Oral BID PRN Rossini, Georges A, DO        thiamine (B-1) injection 500 mg  500 mg Intravenous TID Rossini, Georges A, DO   500 mg at 01/23/25 1944    Followed by    [START ON 1/25/2025] thiamine (B-1) injection 250 mg  250 mg Intravenous Daily Rossini, Georges A, DO        Followed by    [START ON 1/30/2025] thiamine (B-1) tablet 100 mg  100 mg Oral Daily Rossini, Georges A, DO           Allergies  No Known Allergies    ROS:   12 point review of systems was taken and is negative aside from what is noted above in the HPI.     Physical Exam:  /70 (BP Location: Left arm)   Pulse 68   Temp 98.4  F (36.9  C) (Oral)   Resp 16   Ht 1.829 m (6')   Wt 75.9 kg (167 lb 5.3 oz)   SpO2 98%   BMI 22.69 kg/m    General: NAD, alert, cooperative  Head: normocephalic, without abnormality / atraumatic   Abdomen: soft, non tender,  non distended. no suprapubic fullness or tenderness. no CVA tenderness   Geniturinary: wise draining yellow urine with bloody sediment noted, no clot   Extremities: no calf edema or tenderness  Skin: no rashes or lesions  Musculoskeletal: moves all extremities equally  Psychological: alert and oriented, answers questions appropriately      Labs:   WBC   Date Value Ref Range Status   09/22/2011 4.9 4.0 - 11.0 10e9/L Final     WBC Count   Date Value Ref Range Status   01/24/2025 6.4 4.0 - 11.0 10e3/uL Final     Hemoglobin   Date Value Ref Range Status   01/24/2025 10.4 (L) 13.3 - 17.7 g/dL Final   09/22/2011 16.4 13.3 - 17.7 g/dL Final    ]  Creatinine   Date Value Ref Range Status   01/24/2025 0.76 0.67 - 1.17 mg/dL Final   09/22/2011 0.80 0.66 - 1.25 mg/dL Final       Lab Results: personally reviewed     Imaging:  EXAM: CT ABDOMEN PELVIS W CONTRAST  LOCATION: [facility]  DATE: 1/22/2025     INDICATION: Assessment for gross hematuria  COMPARISON: 7/8/2020  TECHNIQUE: CT scan of the abdomen and pelvis was performed following injection of IV contrast. Multiplanar reformats were obtained. Dose reduction techniques were used.  CONTRAST: 90ml isovue 370     FINDINGS:   LOWER CHEST: Normal.     HEPATOBILIARY: Diffuse hepatic steatosis. No significant mass. No bile duct dilatation. No calcified gallstones.     PANCREAS: No significant mass, duct dilatation, or inflammatory change.     SPLEEN: Normal size.     ADRENAL GLANDS: No significant nodules.     KIDNEYS/BLADDER: The bilateral kidneys enhance symmetrically without evidence for hydronephrosis or pyelonephritis. No renal masses or calculi noted. The bilateral ureters and urinary bladder are unremarkable.     BOWEL: Nonspecific nonobstructive bowel gas pattern. Appendix within normal limits.     LYMPH NODES: No lymphadenopathy.     VASCULATURE: No abdominal aortic aneurysm. Moderate vascular calcifications abdominal aorta with mild vascular calcifications common iliac arteries.     PELVIC ORGANS: No pelvic mass. No pelvic free fluid.     MUSCULOSKELETAL: Marked interval compression deformity of T11. Mild to moderate spondylosis thoracic and lumbar spines. Degenerative disc disease at the L5-S1 interspace. No inguinal hernias. No ventral hernia.                                                                      IMPRESSION:   1.  No CT explanation for the hematuria. No urinary calculi or hydronephrosis.  2.  Diffuse hepatic steatosis.  3.  Marked interval compression deformity of T11.    I have personally reviewed the imaging reports above.     Assessment/Plan: Carlos Ivy  is being seen by [facility] for gross hematuria.     - hematuria for the past month or so. Unknown etiology. Wise draining without issues.   - Wise placed for urinary retention, recommend discharge with wise in place and we will arrange outpatient tov in 1-2 weeks.   - start flomax if no contraindication.   - CT w con without hydronephrosis or explanation for hematuria.   - Additional workup outpatient: CTU, urine cytology and cystoscopy, to be completed 1-2 weeks following discharge  - urology will sign off. Please call with questions or concerns. An e-mail has been sent to our schedulers to help facilitate scheduling a follow up appointment. Clinic information placed in the discharge navigator.      Thank you for consulting [facility] regarding this patient's care. Please contact us with questions or concerns.       Tequila Torres PA-C  [facility]  [phone]

## 2025-01-24 NOTE — PLAN OF CARE
Problem: Adult Inpatient Plan of Care  Goal: Plan of Care Review  Description: The Plan of Care Review/Shift note should be completed every shift.  The Outcome Evaluation is a brief statement about your assessment that the patient is improving, declining, or no change.  This information will be displayed automatically on your shift  note.  Flowsheets (Taken 1/24/2025 6062)  Plan of Care Reviewed With: patient  Overall Patient Progress: no change     Problem: Skin Injury Risk Increased  Goal: Skin Health and Integrity  Intervention: Optimize Skin Protection  Recent Flowsheet Documentation  Taken 1/24/2025 5180 by Nichol Sánchez, RN  Activity Management: activity adjusted per tolerance  Head of Bed (HOB) Positioning: HOB at 20-30 degrees   Goal Outcome Evaluation:      Plan of Care Reviewed With: patient    Overall Patient Progress: no changeOverall Patient Progress: no change     Pt A&O to self and place only. VSS. On room air. Tylenol given for pain. CIWA score 4-6. Tele: NSR. IV fluids infusing. Quiles cath draining and patent, urine brown color. Pt resting in bed between cares. Potassium replaced in the morning, next AM draw tomorrow.

## 2025-01-24 NOTE — PROGRESS NOTES
[facility]    Medicine Progress Note - Hospitalist Service    Date of Admission:  1/22/2025    Assessment & Plan   Carlos Ivy is a 59 year old male with history of daily alcohol use, history of withdrawal, history of mood disorder and HTN who is not taking his chronic medications and doesn't seek regular medical care is admitted on 1/22/2025 after an unresponsive episode and reports of gross hematuria.     Toxic encephalopathy  Unresponsive episode  Alcohol abuse  -Mother endorses several days of poor po intake and lack of normal daily alcohol intake, last drink probably a few days ago. Patient denies IDU  -Ethanol level on admission negative  -Patient unable to recall events  -Unsure if he fell or hit his head  -Head CT and neck negative  -EEG-negative for seizure activity. Shows diffusely slow background in theta and delta range that suggests a nonspecific generalized cerebral dysfunction. Can be seen with metabolic or toxic abnormalities.   -CIWA protocol-scoring <10 for >24 hours  -Scheduled diazepam stopped. Patient extremely drowsy and difficult to arouse. Diazepam only as needed from CIWA protocol  -More confused from baseline per sister's report  -Urine drug screen positive for cannabis   -Ammonia-12  -IVF's  -Place on high dose thiamine given risk of Wernicke's  -Folic acid, multivitamin    Hypotension  -SBP-80's x2  -500 ml fluid bolus x2  -Check lactic-0.8  -Check orthostatics-negative    Hypokalemia-resolved  -Replaced per protocol    Anion Gap Metabolic Acidosis-resolved  -Likely starvation ketosis due to poor po intake last several days  -Anion gap-18-->16-->8  -Dextrose containing IVF's  -Hc03 not low enough to warrant bicarb drip for now    Mild hyponatremia: suspect beer potomania  -Monitor for improvement with infusion of isotonic IVF  -Sodium-134-->134-->136    Elevated bilirubin-resolved  -suspect related to Etoh abuse    Chronic neck pain:  -CT C-Spine on admission  shows mild degenerative anterolisthesis of C4 upon C5   -Has not been taking home gabapentin or meloxicam PTA  -Gabapentin dosing as part of [facility] protocol for now    Gross hematuria  -Concern for urinary retention  -Mother reports ongoing gross hematuria for which patient has not sought medical care.   -UA negative for blood  -CT abdomen-negative  -Bladder scanned and straight cathed x 1 for 700 ml on 1/22/25. Attempted 2 more straight caths which were unsuccessful due to nursing staff unable to advance catheter. Coude catheter placed  -Urology consulted-recommend discharging with wise and they will follow up with patient as outpatient in 1-2 weeks. Start flomax    Generalized weakness  -Mother unable to care for him at home  -PT evaluation-recommending TCU-awaiting placement    HTN  -PTA amlodipine    GERD:  -PTA PPI    Mood disorder  -Currently off his home trazodone and lexapro  -Continue to hold for now          Diet: Regular Diet Adult    DVT Prophylaxis: Pneumatic Compression Devices  Wise Catheter: PRESENT, indication:    Lines: None     Cardiac Monitoring: ACTIVE order. Indication: QTc prolonging medication (48 hours)  Code Status: Full Code      Clinically Significant Risk Factors        # Hypokalemia: Lowest K = 3.3 mmol/L in last 2 days, will replace as needed  # Hyponatremia: Lowest Na = 134 mmol/L in last 2 days, will monitor as appropriate  # Hyperchloremia: Highest Cl = 111 mmol/L in last 2 days, will monitor as appropriate          # Hypoalbuminemia: Lowest albumin = 3.4 g/dL at 1/23/2025  6:16 AM, will monitor as appropriate     # Hypertension: Noted on problem list                # Financial/Environmental Concerns: unemployed         Social Drivers of Health    Food Insecurity: Unknown (1/22/2025)    Food Insecurity     Within the past 12 months, did you worry that your food would run out before you got money to buy more?: Patient unable to answer     Within the past 12 months, did the food you  bought just not last and you didn t have money to get more?: Patient unable to answer   Depression: At risk (11/1/2022)    [facility]    PHQ-2     PHQ-2 TOTAL SCORE: 4   Tobacco Use: High Risk (1/23/2025)    Patient History     Smoking Tobacco Use: Every Day     Smokeless Tobacco Use: Never   Financial Resource Strain: Unknown (1/22/2025)    Financial Resource Strain     Within the past 12 months, have you or your family members you live with been unable to get utilities (heat, electricity) when it was really needed?: Patient unable to answer   Transportation Needs: Unknown (1/22/2025)    Transportation Needs     Within the past 12 months, has lack of transportation kept you from medical appointments, getting your medicines, non-medical meetings or appointments, work, or from getting things that you need?: Patient unable to answer    Received from [employer] [facility]    Social Connections          Disposition Plan     Medically Ready for Discharge: Ready Now           The patient's care was discussed with the Attending Physician, Dr. Espino and Patient.    Michelle Bauman NP  Hospitalist Service  [facility]  Securely message with [employer] info)  Text page via [employer] Paging/Directory   ______________________________________________________________________    Interval History     Physical Exam   Vital Signs: Temp: 98.4  F (36.9  C) Temp src: Oral BP: 101/70 Pulse: 68   Resp: 16 SpO2: 98 % O2 Device: None (Room air)    Weight: 167 lbs 5.27 oz    Constitutional: awake, alert, cooperative, no apparent distress, and appears stated age  Respiratory: No increased work of breathing, good air exchange, clear to auscultation bilaterally, no crackles or wheezing  Cardiovascular: Normal apical impulse, regular rate and rhythm, normal S1 and S2, no S3 or S4, and no murmur noted  GI: No scars, normal bowel sounds, soft,  non-distended, non-tender, no masses palpated, no hepatosplenomegally    Medical Decision Making       45 MINUTES SPENT BY ME on the date of service doing chart review, history, exam, documentation & further activities per the note.      Data     I have personally reviewed the following data over the past 24 hrs:    6.4  \   10.4 (L)   / 148 (L)     136 111 (H) 14.5 /  127 (H)   3.5 17 (L) 0.76 \       Imaging results reviewed over the past 24 hrs:   No results found for this or any previous visit (from the past 24 hours).

## 2025-01-24 NOTE — PROGRESS NOTES
01/24/25 1015   Appointment Info   Signing Clinician's Name / Credentials (PT) Bree Torres DPT   Living Environment   People in Home parent(s)  (Mother)   Current Living Arrangements house   Home Accessibility stairs to enter home;stairs within home   Number of Stairs, Main Entrance 1;2   Number of Stairs, Within Home, Primary greater than 10 stairs  (bed/bathroom upstairs)   Stair Railings, Within Home, Primary railings safe and in good condition   Transportation Anticipated health plan transportation   Self-Care   Usual Activity Tolerance good   Current Activity Tolerance poor   Equipment Currently Used at Home none  (owns SEC)   General Information   Onset of Illness/Injury or Date of Surgery 01/22/25   Referring Physician Michelle Mckeon NP   Patient/Family Therapy Goals Statement (PT) none   Pertinent History of Current Problem (include personal factors and/or comorbidities that impact the POC) 59 year old male with history of daily alcohol use, history of withdrawal, history of mood disorder and HTN who is not taking his chronic medications and doesn't seek regular medical care is admitted on 1/22/2025 after an unresponsive episode and reports of gross hematuria.   Existing Precautions/Restrictions fall   Strength (Manual Muscle Testing)   Strength (Manual Muscle Testing) Deficits observed during functional mobility   Bed Mobility   Bed Mobility supine-sit   Supine-Sit [place] (Bed Mobility) supervision;verbal cues   Assistive Device (Bed Mobility) bed rails   Transfers   Transfers sit-stand transfer   Sit-Stand Transfer   Sit-Stand [place] (Transfers) moderate assist (50% patient effort);1 person assist   Assistive Device (Sit-Stand Transfers) walker, front-wheeled   Gait/Stairs (Locomotion)   Comment, (Gait/Stairs) n/a- pt unable to amb functional distance at this time.   Clinical Impression   Criteria for Skilled Therapeutic Intervention Yes, treatment indicated   PT Diagnosis (PT)  Impaired functional mobility   Influenced by the following impairments Weakness, fatigue, dizziness   Functional limitations due to impairments Impaired strength, transfers, gait   Clinical Presentation (PT Evaluation Complexity) stable   Clinical Presentation Rationale Presents as diagnosed   Clinical Decision Making (Complexity) moderate complexity   Planned Therapy Interventions (PT) balance training;bed mobility training;gait training;home exercise program;stair training;strengthening;transfer training   Risk & Benefits of therapy have been explained patient   PT Total Evaluation Time   PT Eval, Moderate Complexity Minutes (97329) 10   Physical Therapy Goals   PT Frequency Daily   PT Predicted Duration/Target Date for Goal Attainment 01/31/25   PT Goals Bed Mobility;Transfers;Gait   PT: Bed Mobility Modified independent;Supine to/from sit   PT: Transfers Supervision/stand-by assist;Sit to/from stand;Bed to/from chair;Assistive device   PT: Gait Minimal assist;Rolling walker;50 feet   PT Discharge Planning   PT Plan progress transfers, amb with chair follow, LE ex   PT Discharge Recommendation (DC Rec) Transitional Care Facility   PT Rationale for DC Rec Unable to ambulate household distance yet due to weakness. Mother unable to physically assist at home. TCU safest d/c plan.   PT Brief overview of current status Bed > recliner, mod Ax1 with FWW.   PT Total Distance Amb During Session (feet) 5   PT Equipment Needed at Discharge walker, rolling   Physical Therapy Time and Intention   Total Session Time (sum of timed and untimed services) 10         Bree Torres, PT, DPT  1/24/2025

## 2025-01-24 NOTE — PLAN OF CARE
Problem: Adult Inpatient Plan of Care  Goal: Absence of Hospital-Acquired Illness or Injury  Intervention: Identify and Manage Fall Risk  Recent Flowsheet Documentation  Taken 1/23/2025 1953 by Chandler Smith RN  Safety Promotion/Fall Prevention:   activity supervised   clutter free environment maintained   room organization consistent   safety round/check completed  Taken 1/23/2025 1641 by Chandler Smith RN  Safety Promotion/Fall Prevention:   activity supervised   clutter free environment maintained   room organization consistent   safety round/check completed     Problem: Adult Inpatient Plan of Care  Goal: Absence of Hospital-Acquired Illness or Injury  Intervention: Prevent Infection  Recent Flowsheet Documentation  Taken 1/23/2025 1953 by Chandler Smith RN  Infection Prevention:   hand hygiene promoted   personal protective equipment utilized   single patient room provided  Taken 1/23/2025 1641 by Chandler Smith RN  Infection Prevention:   hand hygiene promoted   personal protective equipment utilized   single patient room provided     Problem: Fatigue  Goal: Improved Activity Tolerance  Intervention: Promote Improved Energy  Recent Flowsheet Documentation  Taken 1/23/2025 1953 by Chandler Smith RN  Activity Management: activity adjusted per tolerance  Taken 1/23/2025 1641 by Chandler Smith RN  Activity Management: activity adjusted per tolerance       Goal Outcome Evaluation:      Plan of Care Reviewed With: patient, family    Overall Patient Progress: no changeOverall Patient Progress: no change         Somnolent for first four hours of shift but awake for supper, oriented to self and place only. VSS on RA. Denies pain. Seizure precautions maintained. K protocol, recheck in AM. Tele: NSR. CIWA 4, 5. D5NS + KCL 20 mEq/L infusing via L PIV. R PIV SL. Regular diet, appetite poor. Assist x 2. Bladder scans q2h, last reading 483 mL. Family stopped by for support. Nursing continue to monitor.

## 2025-01-25 ENCOUNTER — APPOINTMENT (OUTPATIENT)
Dept: PHYSICAL THERAPY | Facility: HOSPITAL | Age: 60
End: 2025-01-25
Payer: COMMERCIAL

## 2025-01-25 LAB
ALBUMIN SERPL BCG-MCNC: 3 G/DL (ref 3.5–5.2)
ALP SERPL-CCNC: 54 U/L (ref 40–150)
ALT SERPL W P-5'-P-CCNC: 36 U/L (ref 0–70)
ANION GAP SERPL CALCULATED.3IONS-SCNC: 9 MMOL/L (ref 7–15)
AST SERPL W P-5'-P-CCNC: 40 U/L (ref 0–45)
BILIRUB SERPL-MCNC: 0.5 MG/DL
BUN SERPL-MCNC: 7.3 MG/DL (ref 6–20)
CALCIUM SERPL-MCNC: 9.2 MG/DL (ref 8.8–10.4)
CHLORIDE SERPL-SCNC: 112 MMOL/L (ref 98–107)
CREAT SERPL-MCNC: 0.72 MG/DL (ref 0.67–1.17)
EGFRCR SERPLBLD CKD-EPI 2021: >90 ML/MIN/1.73M2
ERYTHROCYTE [DISTWIDTH] IN BLOOD BY AUTOMATED COUNT: 14.3 % (ref 10–15)
GLUCOSE SERPL-MCNC: 105 MG/DL (ref 70–99)
HCO3 SERPL-SCNC: 19 MMOL/L (ref 22–29)
HCT VFR BLD AUTO: 34 % (ref 40–53)
HGB BLD-MCNC: 11.2 G/DL (ref 13.3–17.7)
MAGNESIUM SERPL-MCNC: 1.8 MG/DL (ref 1.7–2.3)
MCH RBC QN AUTO: 31.9 PG (ref 26.5–33)
MCHC RBC AUTO-ENTMCNC: 32.9 G/DL (ref 31.5–36.5)
MCV RBC AUTO: 97 FL (ref 78–100)
PHOSPHATE SERPL-MCNC: 2.7 MG/DL (ref 2.5–4.5)
PLATELET # BLD AUTO: 154 10E3/UL (ref 150–450)
POTASSIUM SERPL-SCNC: 4 MMOL/L (ref 3.4–5.3)
PROT SERPL-MCNC: 5.5 G/DL (ref 6.4–8.3)
RBC # BLD AUTO: 3.51 10E6/UL (ref 4.4–5.9)
SODIUM SERPL-SCNC: 140 MMOL/L (ref 135–145)
WBC # BLD AUTO: 6.1 10E3/UL (ref 4–11)

## 2025-01-25 PROCEDURE — 83735 ASSAY OF MAGNESIUM: CPT | Performed by: HOSPITALIST

## 2025-01-25 PROCEDURE — 250N000013 HC RX MED GY IP 250 OP 250 PS 637

## 2025-01-25 PROCEDURE — 97116 GAIT TRAINING THERAPY: CPT | Mod: GP

## 2025-01-25 PROCEDURE — 85027 COMPLETE CBC AUTOMATED: CPT | Performed by: HOSPITALIST

## 2025-01-25 PROCEDURE — 97110 THERAPEUTIC EXERCISES: CPT | Mod: GP

## 2025-01-25 PROCEDURE — 250N000013 HC RX MED GY IP 250 OP 250 PS 637: Performed by: INTERNAL MEDICINE

## 2025-01-25 PROCEDURE — 82247 BILIRUBIN TOTAL: CPT | Performed by: HOSPITALIST

## 2025-01-25 PROCEDURE — 84155 ASSAY OF PROTEIN SERUM: CPT | Performed by: HOSPITALIST

## 2025-01-25 PROCEDURE — 120N000001 HC R&B MED SURG/OB

## 2025-01-25 PROCEDURE — 250N000011 HC RX IP 250 OP 636: Performed by: INTERNAL MEDICINE

## 2025-01-25 PROCEDURE — 36415 COLL VENOUS BLD VENIPUNCTURE: CPT | Performed by: HOSPITALIST

## 2025-01-25 PROCEDURE — 84100 ASSAY OF PHOSPHORUS: CPT | Performed by: HOSPITALIST

## 2025-01-25 PROCEDURE — 99232 SBSQ HOSP IP/OBS MODERATE 35: CPT | Performed by: HOSPITALIST

## 2025-01-25 PROCEDURE — 82310 ASSAY OF CALCIUM: CPT | Performed by: HOSPITALIST

## 2025-01-25 PROCEDURE — 258N000001 HC RX 258: Performed by: INTERNAL MEDICINE

## 2025-01-25 RX ADMIN — TAMSULOSIN HYDROCHLORIDE 0.4 MG: 0.4 CAPSULE ORAL at 08:53

## 2025-01-25 RX ADMIN — Medication 1 TABLET: at 08:53

## 2025-01-25 RX ADMIN — GABAPENTIN 900 MG: 300 CAPSULE ORAL at 13:17

## 2025-01-25 RX ADMIN — SENNOSIDES AND DOCUSATE SODIUM 2 TABLET: 50; 8.6 TABLET ORAL at 20:45

## 2025-01-25 RX ADMIN — THIAMINE HYDROCHLORIDE 250 MG: 100 INJECTION, SOLUTION INTRAMUSCULAR; INTRAVENOUS at 08:53

## 2025-01-25 RX ADMIN — FOLIC ACID 1 MG: 5 INJECTION, SOLUTION INTRAMUSCULAR; INTRAVENOUS; SUBCUTANEOUS at 09:23

## 2025-01-25 RX ADMIN — AMLODIPINE BESYLATE 2.5 MG: 2.5 TABLET ORAL at 08:53

## 2025-01-25 RX ADMIN — PANTOPRAZOLE SODIUM 20 MG: 20 TABLET, DELAYED RELEASE ORAL at 06:44

## 2025-01-25 RX ADMIN — POTASSIUM CHLORIDE, DEXTROSE MONOHYDRATE AND SODIUM CHLORIDE: 150; 5; 900 INJECTION, SOLUTION INTRAVENOUS at 00:57

## 2025-01-25 RX ADMIN — GABAPENTIN 600 MG: 300 CAPSULE ORAL at 21:43

## 2025-01-25 RX ADMIN — GABAPENTIN 900 MG: 300 CAPSULE ORAL at 06:44

## 2025-01-25 ASSESSMENT — ACTIVITIES OF DAILY LIVING (ADL)
ADLS_ACUITY_SCORE: 43
ADLS_ACUITY_SCORE: 42
ADLS_ACUITY_SCORE: 42
ADLS_ACUITY_SCORE: 43
ADLS_ACUITY_SCORE: 42
ADLS_ACUITY_SCORE: 43
ADLS_ACUITY_SCORE: 43
ADLS_ACUITY_SCORE: 42
ADLS_ACUITY_SCORE: 43

## 2025-01-25 NOTE — PLAN OF CARE
Goal Outcome Evaluation:Pt alert and disoriented to time and situation. Denies pain. CIWA-2. Tremors BUE. Quiles patent.  Seizure precaution in place. /68 (BP Location: Left arm, Patient Position: Semi-Rashid's)   Pulse 74   Temp 98.3  F (36.8  C) (Oral)   Resp 18   Ht 1.829 m (6')   Wt 75.9 kg (167 lb 5.3 oz)   SpO2 99%   BMI 22.69 kg/m

## 2025-01-25 NOTE — PLAN OF CARE
Problem: Adult Inpatient Plan of Care  Goal: Absence of Hospital-Acquired Illness or Injury  Intervention: Prevent Skin Injury  Recent Flowsheet Documentation  Taken 1/25/2025 0800 by Jesús Owen RN  Body Position: position changed independently     Problem: Seizure, Active Management  Goal: Absence of Seizure/Seizure-Related Injury  Outcome: Progressing   Goal Outcome Evaluation:      Plan of Care Reviewed With: patient    Overall Patient Progress: no changeOverall Patient Progress: no change     A&Ox2 w/forgetfulness. Has been pleasant and cooperative with staff. Denies any pain. CIWA assessments done per order and scored a 3 and 5, no PRN medication indicated. Patient up walking with therapy today and tolerated sitting up in recliner. Quiles catheter having good straw colored urine output. No acute changes. VSS on RA.

## 2025-01-25 NOTE — PROGRESS NOTES
"Care Management Follow Up    Length of Stay (days): 2    Expected Discharge Date: 01/25/2025    Anticipated Discharge Plan:  transitional care    Transportation: TBD    PT Recommendations: Transitional Care Facility  OT Recommendations:        Barriers to Discharge: medical stability, placement    Prior Living Situation: house (\"2 story house. 1 steps to enter the house. And then a flight of steps to the bedrooms on the 2nd level\".) with parent(s)    Discussed  Partnership in Safe Discharge Planning  document with patient/family: No     Handoff Completed: No, handoff not indicated or clinically appropriate    Patient/Spokesperson Updated: Yes. Who? Patient and his mother Nita updated at bedside    Additional Information:  SW met with patient and his mother at bedside to introduce self, CM role and review discharge plan.   TCU recommendation discussed. Patient and his mother are agreeable.   TCU choice list provided, pt and mother will review.  SW did discuss chemical dependency and patient's goals relating to his ongoing alcohol use. Pt states he drinks because he is bored. SW discussed treatment and reviewed options for [facility] in [place] which works with patient's over 50 yrs.   SW provided printed information at bedside to patient and mother. Patient is agreeable to remaining sober during TCU stay.     Next Steps: TCU referrals and placement.    Chrystal Granda, LICSW      "

## 2025-01-25 NOTE — DISCHARGE INSTRUCTIONS
[facility]: Outpatient chemical dependency treatment.  Located in: [facility]  Address: [address] [place] [id number]  Phone: [phone]

## 2025-01-25 NOTE — PROGRESS NOTES
[facility]    Medicine Progress Note - Hospitalist Service    Date of Admission:  1/22/2025    Assessment & Plan   59 year old male with PMH of alcohol abuse, HTN and mood disorder admitted for encephalopathy and hematuria. Ethanol level negative.and Utox positive for cannabis. Ammonia level below normal. Head CT and neck negative. EEG negative for seizure but showed diffuse slowing. Patient placed on high dose thiamine, FA and MVI. Patient placed on IVF given poor oral intake. Wise placed for acute urinary retention. Urology consulted and recommend discharge with wise and Flomax. Patient to follow up outpatient for additional workup. Patient alert and oriented x 1-2 but has decisional capacity. Writer had discussion with mother and patient today at bedside regarding discharge planning. Mother states not able to help patient and patient agreeable to discharge to TCU.     #Toxic encephalopathy  #Unresponsive episode  #Alcohol abuse  -Mother endorses several days of poor po intake and lack of normal daily alcohol intake, last drink probably a few days PTA. Patient denies IDU  -Ethanol level on admission negative  -Patient unable to recall events and unsure if he fell or hit his head  -Head CT and neck negative  -EEG-negative for seizure activity. Shows diffusely slow background in theta and delta range that suggests a nonspecific generalized cerebral dysfunction. Can be seen with metabolic or toxic abnormalities.   -CIWA protocol-scoring <10 for >24 hours  -Diazepam only as needed from CIWA protocol  -More confused from baseline per sister's report  -Urine drug screen positive for cannabis   -Ammonia-12  -Continue thiamine, Folic Acid and MVI.    -SW to assist with chemical dependency resource. Alcohol cessation counseling provided.     Hypotension-resolved  -SBP-80's x2  -s/p 500 ml fluid bolus x2  -Lactic-0.8  -Orthostatic negative    Hypokalemia-resolved  -Replaced per protocol    Anion  Gap Metabolic Acidosis-resolved  -Likely starvation ketosis due to poor po intake last several days  -Anion gap-18-->16-->9  -Discontinue Dextrose containing IVF's now that patient is on po diet       Mild hyponatremia: resolved   -suspect beer potomania  -s/p IVF  -Sodium-134-->134-->136-->140    Elevated bilirubin-resolved  -suspect related to Etoh abuse    Chronic neck pain:  -CT C-Spine on admission shows mild degenerative anterolisthesis of C4 upon C5   -Has not been taking home gabapentin or meloxicam PTA  -Gabapentin dosing as part of [facility] protocol for now    Gross hematuria  -Concern for urinary retention  -Mother reports ongoing gross hematuria for which patient has not sought medical care.   -UA negative for blood  -CT abdomen-negative  -Bladder scanned and straight cathed x 1 for 700 ml on 1/22/25. Attempted 2 more straight caths which were unsuccessful due to nursing staff unable to advance catheter. Coude catheter placed  -Urology consulted-recommend discharging with wise and they will follow up with patient as outpatient in 1-2 weeks.   -Started on flomax by urology. Continue     Generalized weakness  -Mother unable to care for him at home  -PT evaluation-recommending TCU-awaiting placement    HTN  -PTA amlodipine    GERD:  -PTA PPI    Mood disorder  -History of being trazodone and lexapro in past but patient not taking. Will not resume for now.              Diet: Regular Diet Adult    DVT Prophylaxis: Pneumatic Compression Devices  Wise Catheter: PRESENT, indication:    Lines: None     Cardiac Monitoring: ACTIVE order. Indication: QTc prolonging medication (48 hours)  Code Status: Full Code      Clinically Significant Risk Factors          # Hyperchloremia: Highest Cl = 111 mmol/L in last 2 days, will monitor as appropriate          # Hypoalbuminemia: Lowest albumin = 3.4 g/dL at 1/23/2025  6:16 AM, will monitor as appropriate     # Hypertension: Noted on problem list                #  Financial/Environmental Concerns: unemployed         Social Drivers of Health    Food Insecurity: Unknown (1/22/2025)    Food Insecurity     Within the past 12 months, did you worry that your food would run out before you got money to buy more?: Patient unable to answer     Within the past 12 months, did the food you bought just not last and you didn t have money to get more?: Patient unable to answer   Depression: At risk (11/1/2022)    Received from [facility] [employer] [facility]    PHQ-2     PHQ-2 TOTAL SCORE: 4   Tobacco Use: High Risk (1/23/2025)    Patient History     Smoking Tobacco Use: Every Day     Smokeless Tobacco Use: Never   Financial Resource Strain: Unknown (1/22/2025)    Financial Resource Strain     Within the past 12 months, have you or your family members you live with been unable to get utilities (heat, electricity) when it was really needed?: Patient unable to answer   Transportation Needs: Unknown (1/22/2025)    Transportation Needs     Within the past 12 months, has lack of transportation kept you from medical appointments, getting your medicines, non-medical meetings or appointments, work, or from getting things that you need?: Patient unable to answer    Received from [employer] [facility]    Social Connections          Disposition Plan     Medically Ready for Discharge: Ready Now             Otilia Espino MD  Hospitalist Service  [facility]  Securely message with [employer] (more info)  Text page via [employer] Paging/Directory   ______________________________________________________________________    Interval History   Patient seen this morning. He reports feeling thirsty but otherwise ok. He believes he currently works and was not in the hospital yesterday. He denies any fever, chills, chest pain, dyspnea, cough, abdominal pain, weakness or sensory deficits.     Physical Exam   Vital Signs: Temp: 98.8  F (37.1  C) Temp src: Oral BP:  130/73 Pulse: 74   Resp: 18 SpO2: 97 % O2 Device: None (Room air)    Weight: 167 lbs 5.27 oz    Constitutional: awake, alert, cooperative, no apparent distress, and appears older than stated age  Eyes: pupils equal, round and reactive to light, extra-ocular muscles intact, and sclera clear  ENT: atraumatic, oral pharynx with moist mucus membranes, tongue at midline  Respiratory: No increased work of breathing, good air exchange, clear to auscultation bilaterally, no crackles or wheezing  Cardiovascular: regular rate and rhythm and normal S1 and S2  GI: normal bowel sounds, soft, non-distended, and non-tender  Musculoskeletal: no lower extremity pitting edema present  full range of motion noted  Neurologic: Mental Status Exam:  Orientation:   person, place  Memory:   abnormal   Attention/Concentration:  normal  Language:  normal  Cranial Nerves:  cranial nerves II-XII are grossly intact    Medical Decision Making       47 MINUTES SPENT BY ME on the date of service doing chart review, history, exam, documentation & further activities per the note.      Data     I have personally reviewed the following data over the past 24 hrs:    6.1  \   11.2 (L)   / 154     140 112 (H) 7.3 /  105 (H)   4.0 19 (L) 0.72 \     ALT: 36 AST: 40 AP: 54 TBILI: 0.5   ALB: 3.0 (L) TOT PROTEIN: 5.5 (L) LIPASE: N/A

## 2025-01-26 ENCOUNTER — APPOINTMENT (OUTPATIENT)
Dept: PHYSICAL THERAPY | Facility: HOSPITAL | Age: 60
End: 2025-01-26
Payer: COMMERCIAL

## 2025-01-26 LAB
HOLD SPECIMEN: NORMAL
POTASSIUM SERPL-SCNC: 4.2 MMOL/L (ref 3.4–5.3)

## 2025-01-26 PROCEDURE — 250N000011 HC RX IP 250 OP 636: Performed by: INTERNAL MEDICINE

## 2025-01-26 PROCEDURE — 120N000001 HC R&B MED SURG/OB

## 2025-01-26 PROCEDURE — 250N000013 HC RX MED GY IP 250 OP 250 PS 637: Performed by: INTERNAL MEDICINE

## 2025-01-26 PROCEDURE — 99232 SBSQ HOSP IP/OBS MODERATE 35: CPT | Performed by: STUDENT IN AN ORGANIZED HEALTH CARE EDUCATION/TRAINING PROGRAM

## 2025-01-26 PROCEDURE — 84132 ASSAY OF SERUM POTASSIUM: CPT | Performed by: HOSPITALIST

## 2025-01-26 PROCEDURE — 97116 GAIT TRAINING THERAPY: CPT | Mod: GP

## 2025-01-26 PROCEDURE — 97110 THERAPEUTIC EXERCISES: CPT | Mod: GP

## 2025-01-26 PROCEDURE — 36415 COLL VENOUS BLD VENIPUNCTURE: CPT | Performed by: HOSPITALIST

## 2025-01-26 PROCEDURE — 250N000013 HC RX MED GY IP 250 OP 250 PS 637

## 2025-01-26 RX ORDER — BISACODYL 10 MG
10 SUPPOSITORY, RECTAL RECTAL DAILY PRN
Status: DISCONTINUED | OUTPATIENT
Start: 2025-01-26 | End: 2025-01-28 | Stop reason: HOSPADM

## 2025-01-26 RX ADMIN — Medication 1 TABLET: at 08:09

## 2025-01-26 RX ADMIN — AMLODIPINE BESYLATE 2.5 MG: 2.5 TABLET ORAL at 08:09

## 2025-01-26 RX ADMIN — GABAPENTIN 600 MG: 300 CAPSULE ORAL at 20:52

## 2025-01-26 RX ADMIN — TAMSULOSIN HYDROCHLORIDE 0.4 MG: 0.4 CAPSULE ORAL at 08:08

## 2025-01-26 RX ADMIN — PANTOPRAZOLE SODIUM 20 MG: 20 TABLET, DELAYED RELEASE ORAL at 08:09

## 2025-01-26 RX ADMIN — ACETAMINOPHEN 650 MG: 325 TABLET ORAL at 20:53

## 2025-01-26 RX ADMIN — POLYETHYLENE GLYCOL 3350 17 G: 17 POWDER, FOR SOLUTION ORAL at 17:26

## 2025-01-26 RX ADMIN — FOLIC ACID 1 MG: 5 INJECTION, SOLUTION INTRAMUSCULAR; INTRAVENOUS; SUBCUTANEOUS at 08:07

## 2025-01-26 RX ADMIN — GABAPENTIN 600 MG: 300 CAPSULE ORAL at 05:06

## 2025-01-26 RX ADMIN — GABAPENTIN 600 MG: 300 CAPSULE ORAL at 13:44

## 2025-01-26 RX ADMIN — SENNOSIDES AND DOCUSATE SODIUM 2 TABLET: 50; 8.6 TABLET ORAL at 17:26

## 2025-01-26 RX ADMIN — THIAMINE HYDROCHLORIDE 250 MG: 100 INJECTION, SOLUTION INTRAMUSCULAR; INTRAVENOUS at 08:08

## 2025-01-26 ASSESSMENT — ACTIVITIES OF DAILY LIVING (ADL)
ADLS_ACUITY_SCORE: 43
ADLS_ACUITY_SCORE: 42
ADLS_ACUITY_SCORE: 42
ADLS_ACUITY_SCORE: 43
ADLS_ACUITY_SCORE: 42
ADLS_ACUITY_SCORE: 43
ADLS_ACUITY_SCORE: 42
ADLS_ACUITY_SCORE: 43
ADLS_ACUITY_SCORE: 42
ADLS_ACUITY_SCORE: 42
ADLS_ACUITY_SCORE: 43
ADLS_ACUITY_SCORE: 43
ADLS_ACUITY_SCORE: 42
ADLS_ACUITY_SCORE: 42
ADLS_ACUITY_SCORE: 43

## 2025-01-26 NOTE — PLAN OF CARE
Problem: Skin Injury Risk Increased  Goal: Skin Health and Integrity  Outcome: Progressing  Intervention: Plan: Nurse Driven Intervention: Moisture Management  Recent Flowsheet Documentation  Taken 1/26/2025 0800 by Jesús Owen RN  Moisture Interventions:   Encourage regular toileting   No brief in bed   Incontinence pad   Urinary collection device     Problem: Comorbidity Management  Goal: Maintenance of Seizure Control  Outcome: Progressing   Goal Outcome Evaluation:      Plan of Care Reviewed With: patient    Overall Patient Progress: no changeOverall Patient Progress: no change     A&Ox1-2, forgetful and forgets to use call light so staff rounds on patient often. Denies any pain. Pleasant and cooperative with staff. Quiles catheter in place and is having good straw colored urine output. CIWAs done per order and scored a 4 and 5. No acute changes. VSS on RA. Will discharge to TCU when accepted.

## 2025-01-26 NOTE — PLAN OF CARE
"Goal Outcome Evaluation:       Patient scored a 3 and 8 on CIWA assessments. On initial assessment patient was alert and oriented x3, disoriented to situation stating he is here due to the alien investigation. On second assessment patient was alert and oriented to self only stating we are in a hospital in [place], it is November [birth year]87 and he is here \"looking for a little boy.\" Disorientation was cause of increase in CIWA score. Spoke with resident and obtained order to hold the Valium because disorientation is likely attributed to the encephalopathy and not alcohol withdrawal. Denies pain.                  "

## 2025-01-26 NOTE — PROGRESS NOTES
"Care Management Follow Up    Length of Stay (days): 3    Expected Discharge Date: 01/26/2025    Anticipated Discharge Plan:  Transitional Care    Transportation: TBD    PT Recommendations: Transitional Care Facility  OT Recommendations:        Barriers to Discharge: placement    Prior Living Situation: house (\"2 story house. 1 steps to enter the house. And then a flight of steps to the bedrooms on the 2nd level\".) with parent(s)    Discussed  Partnership in Safe Discharge Planning  document with patient/family: No     Handoff Completed: No, handoff not indicated or clinically appropriate    Patient/Spokesperson Updated: No    Additional Information:  Call received from patient's sister Cammy Mae. TCU choice list was reviewed and she provided TCU choices.   Referrals pending.     Patient's sister Cammy: [phone].    Next Steps: TCU placement    Chrystal Granda, LICSW      "

## 2025-01-26 NOTE — PROGRESS NOTES
[facility]    Medicine Progress Note - Hospitalist Service    Date of Admission:  1/22/2025    Assessment & Plan   59 year old male with PMH of alcohol abuse, HTN and mood disorder admitted for encephalopathy and hematuria. Ethanol level negative.and Utox positive for cannabis. Ammonia level below normal. Head CT and neck negative. EEG negative for seizure but showed diffuse slowing. Patient placed on high dose thiamine, FA and MVI. Patient placed on IVF given poor oral intake. Wise placed for acute urinary retention. Urology consulted and recommend discharge with wise and Flomax. Patient to follow up outpatient for additional workup. Patient alert and oriented x 1-2 but has decisional capacity. Writer had discussion with mother and patient today at bedside regarding discharge planning. Mother states not able to help patient and patient agreeable to discharge to TCU.     #Toxic encephalopathy  #Unresponsive episode  #Alcohol abuse  Mother endorses several days of poor po intake and lack of normal daily alcohol intake, last drink probably a few days PTA. Patient denies IDU  Ethanol level on admission negative  Patient unable to recall events and unsure if he fell or hit his head  Head CT and neck negative  EEG-negative for seizure activity. Shows diffusely slow background in theta and delta range that suggests a nonspecific generalized cerebral dysfunction. Can be seen with metabolic or toxic abnormalities.   Urine drug screen positive for cannabis   Ammonia-12  More confused from baseline per sister's report  -Discontinue CIWA protocol-scoring <10 for >24 hours  -Continue thiamine, Folic Acid and MVI.    -SW to assist with chemical dependency resource. Alcohol cessation counseling provided.     Hypotension-resolved  SBP-80's x2, s/p 500 ml fluid bolus x2, Lactic-0.8, Orthostatic negative    Hypokalemia-resolved  -Replaced per protocol    Anion Gap Metabolic Acidosis-resolved  Likely  starvation ketosis due to poor po intake, Anion gap-18-->16-->9, Discontinued Dextrose containing IVF's now that patient is on po diet    Mild hyponatremia: resolved   suspect beer potomania, s/p IVF, Sodium-134-->134-->136-->140    Elevated bilirubin-resolved  suspect related to Etoh abuse    Chronic neck pain:  CT C-Spine on admission shows mild degenerative anterolisthesis of C4 upon C5   Has not been taking home gabapentin or meloxicam PTA  - gabapentin    Gross hematuria  Concern for urinary retention, Mother reports ongoing gross hematuria for which patient has not sought medical care. UA negative for blood, CT abdomen-negative, Bladder scanned and straight cathed x 1 for 700 ml on 1/22/25. Attempted 2 more straight caths which were unsuccessful due to nursing staff unable to advance catheter. Coude catheter placed  Urology consulted-recommend discharging with wise and they will follow up with patient as outpatient in 1-2 weeks.   -Started on flomax by urology. Continue     Generalized weakness  Mother unable to care for him at home  -PT evaluation-recommending TCU-awaiting placement    HTN  -PTA amlodipine    GERD:  -PTA PPI    Mood disorder  -History of being trazodone and lexapro in past but patient not taking. Will not resume for now.           Diet: Regular Diet Adult    DVT Prophylaxis: Pneumatic Compression Devices  Wise Catheter: PRESENT, indication:    Lines: None     Cardiac Monitoring: None  Code Status: Full Code      Clinically Significant Risk Factors          # Hyperchloremia: Highest Cl = 112 mmol/L in last 2 days, will monitor as appropriate          # Hypoalbuminemia: Lowest albumin = 3 g/dL at 1/25/2025  7:30 AM, will monitor as appropriate     # Hypertension: Noted on problem list                # Financial/Environmental Concerns: unemployed         Social Drivers of Health    Food Insecurity: Unknown (1/22/2025)    Food Insecurity     Within the past 12 months, did you worry that your  food would run out before you got money to buy more?: Patient unable to answer     Within the past 12 months, did the food you bought just not last and you didn t have money to get more?: Patient unable to answer   Depression: At risk (11/1/2022)    Received from [facility] [employer] [facility]    PHQ-2     PHQ-2 TOTAL SCORE: 4   Tobacco Use: High Risk (1/23/2025)    Patient History     Smoking Tobacco Use: Every Day     Smokeless Tobacco Use: Never   Financial Resource Strain: Unknown (1/22/2025)    Financial Resource Strain     Within the past 12 months, have you or your family members you live with been unable to get utilities (heat, electricity) when it was really needed?: Patient unable to answer   Transportation Needs: Unknown (1/22/2025)    Transportation Needs     Within the past 12 months, has lack of transportation kept you from medical appointments, getting your medicines, non-medical meetings or appointments, work, or from getting things that you need?: Patient unable to answer    Received from [facility] [employer] [facility]    Social Connections          Disposition Plan     Medically Ready for Discharge: Ready Now             Luigi Wills DO  Hospitalist Service  [facility]  Securely message with [employer] (more info)  Text page via [employer] Paging/Directory   ______________________________________________________________________    Interval History     Physical Exam   Vital Signs: Temp: 98.5  F (36.9  C) Temp src: Oral BP: (!) 146/61 Pulse: 77   Resp: 18 SpO2: 98 % O2 Device: None (Room air)    Weight: 172 lbs 8 oz    Constitutional: awake, alert, cooperative, no apparent distress, and appears older than stated age, accompanied by sister  Eyes: pupils equal, round and reactive to light, extra-ocular muscles intact, and sclera clear  ENT: atraumatic, oral pharynx with moist mucus membranes, tongue at midline  Respiratory: No increased work of  breathing, good air exchange, clear to auscultation bilaterally, no crackles or wheezing  Cardiovascular: regular rate and rhythm and normal S1 and S2  GI: normal bowel sounds, soft, non-distended, and non-tender  Musculoskeletal: no lower extremity pitting edema present  full range of motion noted  Neurologic: Mental Status Exam:  Orientation:   person, place  Memory:   abnormal   Attention/Concentration:  normal  Language:  normal  Cranial Nerves:  cranial nerves II-XII are grossly intact    Medical Decision Making       45 MINUTES SPENT BY ME on the date of service doing chart review, history, exam, documentation & further activities per the note.      Data     I have personally reviewed the following data over the past 24 hrs:    N/A  \   N/A   / N/A     N/A N/A N/A /  N/A   4.2 N/A N/A \

## 2025-01-26 NOTE — PLAN OF CARE
Problem: Comorbidity Management  Goal: Maintenance of Seizure Control  Outcome: Progressing  Intervention: Maintain Seizure Symptom Control  Recent Flowsheet Documentation  Taken 1/25/2025 1628 by Shaheed Pal, RN  Medication Review/Management: medications reviewed     Problem: Skin Injury Risk Increased  Goal: Skin Health and Integrity  Outcome: Progressing  Intervention: Plan: Nurse Driven Intervention: Moisture Management  Recent Flowsheet Documentation  Taken 1/25/2025 1935 by Shaheed Pal, RN  Bathing/Skin Care: bath, partial  Intervention: Optimize Skin Protection  Recent Flowsheet Documentation  Taken 1/25/2025 1935 by Shaheed Pal, RN  Activity Management: activity adjusted per tolerance    Goal Outcome Evaluation:    Pt is calm and cooperative. Pt is more confused later in the evening. Alert to self. VSS. CIWA score 4 and 2. Good appetite. Assist of 1 with walker and belt. Constipated, senna and prune juice given. No result yet. Quiles intact and patent. Bed alarm on.

## 2025-01-27 ENCOUNTER — APPOINTMENT (OUTPATIENT)
Dept: PHYSICAL THERAPY | Facility: HOSPITAL | Age: 60
End: 2025-01-27
Payer: COMMERCIAL

## 2025-01-27 LAB
HOLD SPECIMEN: NORMAL
POTASSIUM SERPL-SCNC: 4.4 MMOL/L (ref 3.4–5.3)

## 2025-01-27 PROCEDURE — 120N000001 HC R&B MED SURG/OB

## 2025-01-27 PROCEDURE — 97116 GAIT TRAINING THERAPY: CPT | Mod: GP | Performed by: PHYSICAL THERAPIST

## 2025-01-27 PROCEDURE — 250N000013 HC RX MED GY IP 250 OP 250 PS 637: Performed by: INTERNAL MEDICINE

## 2025-01-27 PROCEDURE — 97110 THERAPEUTIC EXERCISES: CPT | Mod: GP | Performed by: PHYSICAL THERAPIST

## 2025-01-27 PROCEDURE — 36415 COLL VENOUS BLD VENIPUNCTURE: CPT | Performed by: STUDENT IN AN ORGANIZED HEALTH CARE EDUCATION/TRAINING PROGRAM

## 2025-01-27 PROCEDURE — 90673 RIV3 VACCINE NO PRESERV IM: CPT | Performed by: HOSPITALIST

## 2025-01-27 PROCEDURE — 250N000011 HC RX IP 250 OP 636: Performed by: HOSPITALIST

## 2025-01-27 PROCEDURE — 250N000013 HC RX MED GY IP 250 OP 250 PS 637

## 2025-01-27 PROCEDURE — G0008 ADMIN INFLUENZA VIRUS VAC: HCPCS | Performed by: HOSPITALIST

## 2025-01-27 PROCEDURE — 250N000011 HC RX IP 250 OP 636: Performed by: INTERNAL MEDICINE

## 2025-01-27 PROCEDURE — 84132 ASSAY OF SERUM POTASSIUM: CPT | Performed by: STUDENT IN AN ORGANIZED HEALTH CARE EDUCATION/TRAINING PROGRAM

## 2025-01-27 PROCEDURE — 99232 SBSQ HOSP IP/OBS MODERATE 35: CPT | Performed by: INTERNAL MEDICINE

## 2025-01-27 RX ORDER — FOLIC ACID 1 MG/1
1 TABLET ORAL DAILY
Status: DISCONTINUED | OUTPATIENT
Start: 2025-01-28 | End: 2025-01-28 | Stop reason: HOSPADM

## 2025-01-27 RX ADMIN — GABAPENTIN 300 MG: 300 CAPSULE ORAL at 21:17

## 2025-01-27 RX ADMIN — GABAPENTIN 600 MG: 300 CAPSULE ORAL at 14:44

## 2025-01-27 RX ADMIN — Medication 5 MG: at 21:16

## 2025-01-27 RX ADMIN — GABAPENTIN 600 MG: 300 CAPSULE ORAL at 06:49

## 2025-01-27 RX ADMIN — FOLIC ACID 1 MG: 5 INJECTION, SOLUTION INTRAMUSCULAR; INTRAVENOUS; SUBCUTANEOUS at 09:02

## 2025-01-27 RX ADMIN — Medication 250 MG: at 09:54

## 2025-01-27 RX ADMIN — PANTOPRAZOLE SODIUM 20 MG: 20 TABLET, DELAYED RELEASE ORAL at 06:49

## 2025-01-27 RX ADMIN — TAMSULOSIN HYDROCHLORIDE 0.4 MG: 0.4 CAPSULE ORAL at 09:02

## 2025-01-27 RX ADMIN — INFLUENZA A VIRUS A/WEST VIRGINIA/30/2022 (H1N1) RECOMBINANT HEMAGGLUTININ ANTIGEN, INFLUENZA A VIRUS A/MASSACHUSETTS/18/2022 (H3N2) RECOMBINANT HEMAGGLUTININ ANTIGEN, AND INFLUENZA B VIRUS B/AUSTRIA/1359417/2021 RECOMBINANT HEMAGGLUTININ ANTIGEN 0.5 ML: 45; 45; 45 INJECTION INTRAMUSCULAR at 10:01

## 2025-01-27 RX ADMIN — Medication 1 TABLET: at 09:02

## 2025-01-27 RX ADMIN — AMLODIPINE BESYLATE 2.5 MG: 2.5 TABLET ORAL at 09:02

## 2025-01-27 ASSESSMENT — ACTIVITIES OF DAILY LIVING (ADL)
ADLS_ACUITY_SCORE: 43

## 2025-01-27 NOTE — PROGRESS NOTES
"Care Management Follow Up    Length of Stay (days): 4    Expected Discharge Date: 01/28/2025    Anticipated Discharge Plan:  Transitional Care    Transportation: Confirmed Wheelchair. Transportation costs discussed? Yes. Discussed with Pt's mom Nita    PT Recommendations: Transitional Care Facility  OT Recommendations:        Barriers to Discharge: transportation    Prior Living Situation: house (\"2 story house. 1 steps to enter the house. And then a flight of steps to the bedrooms on the 2nd level\".) with parent(s)    Advanced Directive on File: no concerns identified (\"doesn't have one\")     Patient/Spokesperson Updated: Yes. Who? Pt's mom Nita    Additional Information:  Pt accepted at [facility] for tomorrow 1/28. SW confirmed discharge plan and transportation costs with Pt's mom Nita; she is agreeable. Anticipate Pt to discharge to [facility] 1/28 via [facility] wheelchair transport between 10:40-11:20AM.     Hortencia Palomino BSW     "

## 2025-01-27 NOTE — PLAN OF CARE
Goal Outcome Evaluation:  Alert and oriented to self and place. Disoriented to situation and time. Forgetful . Weak and unsteady with walking. Ambulate with assist of 1 and walker. Denied pain. Calm and cooperative with cares.     Bhavna Christianson RN

## 2025-01-27 NOTE — PLAN OF CARE
Problem: Skin Injury Risk Increased  Goal: Skin Health and Integrity  Outcome: Progressing     Problem: Seizure, Active Management  Goal: Absence of Seizure/Seizure-Related Injury  Outcome: Progressing  Intervention: Prevent Seizure-Related Injury  Recent Flowsheet Documentation  Taken 1/26/2025 1545 by Shaheed Pal, RN  Seizure Precautions: side rails padded       Goal Outcome Evaluation:    Pt is calm and cooperative. Alert to self only. VSS. Good appetite. Up in the chair for most of the shift. Unsteady gait, assist of 1 with walker and belt. 1 BM this shift. Quiles intact and patent. Bed alarm on.

## 2025-01-27 NOTE — PLAN OF CARE
Problem: Adult Inpatient Plan of Care  Goal: Optimal Comfort and Wellbeing  Outcome: Progressing     Problem: Skin Injury Risk Increased  Goal: Skin Health and Integrity  Outcome: Progressing  Intervention: Plan: Nurse Driven Intervention: Moisture Management  Recent Flowsheet Documentation  Taken 1/27/2025 0100 by Alfonso Swan RN  Moisture Interventions:   Encourage regular toileting   Incontinence pad  Intervention: Optimize Skin Protection  Recent Flowsheet Documentation  Taken 1/27/2025 0100 by Alfonso Swan RN  Activity Management: activity adjusted per tolerance  Head of Bed (HOB) Positioning: HOB at 20-30 degrees     Problem: Seizure, Active Management  Goal: Absence of Seizure/Seizure-Related Injury  Outcome: Progressing  Intervention: Prevent Seizure-Related Injury  Recent Flowsheet Documentation  Taken 1/27/2025 0100 by Alfonso Swan RN  Seizure Precautions: side rails padded     Problem: Comorbidity Management  Goal: Maintenance of Seizure Control  Outcome: Progressing  Intervention: Maintain Seizure Symptom Control  Recent Flowsheet Documentation  Taken 1/27/2025 0100 by Alfonso Swan RN  Seizure Precautions: side rails padded  Medication Review/Management: medications reviewed   Goal Outcome Evaluation:       A&O x 1 to self only. Ambulates 1x assist w/walker and gait belt. Admitted for toxic encephalopathy. PIV access left antecubital space. Quiles in place with moderate output. Potassium protocol. Potassium recheck scheduled 1/27/2025 morning. VSS on RA. Pt denies pain. Bed alarm on.   Visit Vitals  /78 (BP Location: Right arm)   Pulse 66   Temp 98.2  F (36.8  C) (Oral)   Resp 16

## 2025-01-27 NOTE — PROGRESS NOTES
[facility]    Medicine Progress Note - Hospitalist Service    Date of Admission:  1/22/2025    Assessment & Plan   59 year old male with PMH of alcohol abuse, HTN and mood disorder admitted for encephalopathy and hematuria. Ethanol level negative.and Utox positive for cannabis. Ammonia level below normal. Head CT and neck negative. EEG negative for seizure but showed diffuse slowing. Patient placed on high dose thiamine, FA and MVI. Patient placed on IVF given poor oral intake. Wise placed for acute urinary retention. Urology consulted and recommend discharge with wise and Flomax. Patient to follow up outpatient for additional workup. Patient alert and oriented x 1-2 but has decisional capacity.  Waiting for TCU placement    #Toxic encephalopathy  #Unresponsive episode  #Alcohol abuse  Mother endorses several days of poor po intake and lack of normal daily alcohol intake, last drink probably a few days PTA. Patient denies IDU  Ethanol level on admission negative  Patient unable to recall events and unsure if he fell or hit his head  Head CT and neck negative  EEG-negative for seizure activity. Shows diffusely slow background in theta and delta range that suggests a nonspecific generalized cerebral dysfunction. Can be seen with metabolic or toxic abnormalities.   Urine drug screen positive for cannabis   Ammonia-12  More confused from baseline per sister's report  -Discontinue CIWA protocol-scoring <10 for >24 hours  -Continue thiamine, Folic Acid and MVI.    - to assist with chemical dependency resource. Alcohol cessation counseling provided.     Hypotension-resolved  SBP-80's x2, s/p 500 ml fluid bolus x2, Lactic-0.8, Orthostatic negative    Hypokalemia-resolved  -Replaced per protocol    Anion Gap Metabolic Acidosis-resolved  Likely starvation ketosis due to poor po intake, Anion gap-18-->16-->9, Discontinued Dextrose containing IVF's now that patient is on po diet    Mild  hyponatremia: resolved   suspect beer potomania, s/p IVF, Sodium-134-->134-->136-->140    Elevated bilirubin-resolved  suspect related to Etoh abuse    Chronic neck pain:  CT C-Spine on admission shows mild degenerative anterolisthesis of C4 upon C5   Has not been taking home gabapentin or meloxicam PTA  - gabapentin    Gross hematuria  Concern for urinary retention, Mother reports ongoing gross hematuria for which patient has not sought medical care. UA negative for blood, CT abdomen-negative, Bladder scanned and straight cathed x 1 for 700 ml on 1/22/25. Attempted 2 more straight caths which were unsuccessful due to nursing staff unable to advance catheter. Coude catheter placed  Urology consulted-recommend discharging with wise and they will follow up with patient as outpatient in 1-2 weeks.   -Started on flomax by urology. Continue     Generalized weakness  Mother unable to care for him at home  -PT evaluation-recommending TCU-awaiting placement    HTN  -PTA amlodipine    GERD:  -PTA PPI    Mood disorder  -History of being trazodone and lexapro in past but patient not taking. Will not resume for now.           Diet: Regular Diet Adult    DVT Prophylaxis: Ambulate every shift  Wise Catheter: PRESENT, indication:    Lines: None     Cardiac Monitoring: None  Code Status: Full Code      Clinically Significant Risk Factors               # Hypoalbuminemia: Lowest albumin = 3 g/dL at 1/25/2025  7:30 AM, will monitor as appropriate     # Hypertension: Noted on problem list                # Financial/Environmental Concerns: unemployed         Social Drivers of Health    Food Insecurity: Unknown (1/22/2025)    Food Insecurity     Within the past 12 months, did you worry that your food would run out before you got money to buy more?: Patient unable to answer     Within the past 12 months, did the food you bought just not last and you didn t have money to get more?: Patient unable to answer   Depression: At risk  (11/1/2022)    Received from [facility]    PHQ-2     PHQ-2 TOTAL SCORE: 4   Tobacco Use: High Risk (1/23/2025)    Patient History     Smoking Tobacco Use: Every Day     Smokeless Tobacco Use: Never   Financial Resource Strain: Unknown (1/22/2025)    Financial Resource Strain     Within the past 12 months, have you or your family members you live with been unable to get utilities (heat, electricity) when it was really needed?: Patient unable to answer   Transportation Needs: Unknown (1/22/2025)    Transportation Needs     Within the past 12 months, has lack of transportation kept you from medical appointments, getting your medicines, non-medical meetings or appointments, work, or from getting things that you need?: Patient unable to answer    Received from [facility]    Social Connections          Disposition Plan     Medically Ready for Discharge: Ready Now             Betina Robledo MD  Hospitalist Service  [facility]  Securely message with [employer] (more info)  Text page via [employer] Paging/Directory   ______________________________________________________________________    Interval History   No acute issues  Denies having any pain  Reports having good appetite  Quiles is patent    Physical Exam   Vital Signs: Temp: 98.8  F (37.1  C) Temp src: Oral BP: (!) 141/78 Pulse: 68   Resp: 17 SpO2: 100 % O2 Device: None (Room air)    Weight: 174 lbs 13.2 oz    General Appearance: No acute distress, sitting in a recliner  Respiratory: Respirations unlabored, lungs are clear bilaterally  Cardiovascular: Regular rate and rhythm.  Normal S1-S2  GI: Abdomen soft, mildly distended, nontender, bowel sounds present  Other: Awake, alert, intentional tremors noted    Medical Decision Making       45 MINUTES SPENT BY ME on the date of service doing chart review, history, exam, documentation & further activities per the note.  MANAGEMENT  DISCUSSED with the following over the past 24 hours: Patient and nursing staff       Data     I have personally reviewed the following data over the past 24 hrs:    N/A  \   N/A   / N/A     N/A N/A N/A /  N/A   4.4 N/A N/A \       Imaging results reviewed over the past 24 hrs:   No results found for this or any previous visit (from the past 24 hours).

## 2025-01-28 ENCOUNTER — MEDICAL CORRESPONDENCE (OUTPATIENT)
Dept: HEALTH INFORMATION MANAGEMENT | Facility: CLINIC | Age: 60
End: 2025-01-28
Payer: COMMERCIAL

## 2025-01-28 VITALS
HEIGHT: 72 IN | HEART RATE: 75 BPM | RESPIRATION RATE: 17 BRPM | SYSTOLIC BLOOD PRESSURE: 114 MMHG | TEMPERATURE: 98.4 F | OXYGEN SATURATION: 97 % | BODY MASS INDEX: 23.56 KG/M2 | DIASTOLIC BLOOD PRESSURE: 60 MMHG | WEIGHT: 173.94 LBS

## 2025-01-28 LAB
HOLD SPECIMEN: NORMAL
POTASSIUM SERPL-SCNC: 4.2 MMOL/L (ref 3.4–5.3)

## 2025-01-28 PROCEDURE — 36415 COLL VENOUS BLD VENIPUNCTURE: CPT | Performed by: INTERNAL MEDICINE

## 2025-01-28 PROCEDURE — 99239 HOSP IP/OBS DSCHRG MGMT >30: CPT | Performed by: INTERNAL MEDICINE

## 2025-01-28 PROCEDURE — 250N000013 HC RX MED GY IP 250 OP 250 PS 637: Performed by: INTERNAL MEDICINE

## 2025-01-28 PROCEDURE — 250N000013 HC RX MED GY IP 250 OP 250 PS 637

## 2025-01-28 PROCEDURE — 84132 ASSAY OF SERUM POTASSIUM: CPT | Performed by: INTERNAL MEDICINE

## 2025-01-28 RX ORDER — MULTIPLE VITAMINS W/ MINERALS TAB 9MG-400MCG
1 TAB ORAL DAILY
DISCHARGE
Start: 2025-01-28

## 2025-01-28 RX ORDER — LANOLIN ALCOHOL/MO/W.PET/CERES
100 CREAM (GRAM) TOPICAL DAILY
DISCHARGE
Start: 2025-01-29

## 2025-01-28 RX ORDER — FOLIC ACID 1 MG/1
1 TABLET ORAL DAILY
DISCHARGE
Start: 2025-01-28

## 2025-01-28 RX ORDER — GABAPENTIN 100 MG/1
CAPSULE ORAL
DISCHARGE
Start: 2025-01-28 | End: 2025-01-30

## 2025-01-28 RX ORDER — TAMSULOSIN HYDROCHLORIDE 0.4 MG/1
0.4 CAPSULE ORAL EVERY EVENING
DISCHARGE
Start: 2025-01-28

## 2025-01-28 RX ORDER — ACETAMINOPHEN 500 MG
500 TABLET ORAL EVERY 6 HOURS PRN
DISCHARGE
Start: 2025-01-28

## 2025-01-28 RX ADMIN — ACETAMINOPHEN 650 MG: 325 TABLET ORAL at 02:40

## 2025-01-28 RX ADMIN — PANTOPRAZOLE SODIUM 20 MG: 20 TABLET, DELAYED RELEASE ORAL at 06:39

## 2025-01-28 RX ADMIN — Medication 250 MG: at 09:13

## 2025-01-28 RX ADMIN — Medication 1 TABLET: at 09:14

## 2025-01-28 RX ADMIN — AMLODIPINE BESYLATE 2.5 MG: 2.5 TABLET ORAL at 09:14

## 2025-01-28 RX ADMIN — GABAPENTIN 300 MG: 300 CAPSULE ORAL at 06:38

## 2025-01-28 RX ADMIN — FOLIC ACID 1 MG: 1 TABLET ORAL at 09:14

## 2025-01-28 RX ADMIN — TAMSULOSIN HYDROCHLORIDE 0.4 MG: 0.4 CAPSULE ORAL at 09:14

## 2025-01-28 ASSESSMENT — ACTIVITIES OF DAILY LIVING (ADL)
ADLS_ACUITY_SCORE: 46
ADLS_ACUITY_SCORE: 46
ADLS_ACUITY_SCORE: 41
ADLS_ACUITY_SCORE: 46
ADLS_ACUITY_SCORE: 46
ADLS_ACUITY_SCORE: 43
ADLS_ACUITY_SCORE: 41
ADLS_ACUITY_SCORE: 41
ADLS_ACUITY_SCORE: 46
ADLS_ACUITY_SCORE: 43
ADLS_ACUITY_SCORE: 41

## 2025-01-28 NOTE — PLAN OF CARE
Physical Therapy Discharge Summary    Reason for therapy discharge:    Discharged to transitional care facility.    Progress towards therapy goal(s). See goals on Care Plan in [facility] electronic health record for goal details.  Goals partially met.  Barriers to achieving goals:   discharge from facility.    Therapy recommendation(s):    Continued therapy is recommended.  Rationale/Recommendations:  recommend continued PT at TCU.    Denise Yee, PT  1/28/2025

## 2025-01-28 NOTE — DISCHARGE SUMMARY
[facility]  Hospitalist Discharge Summary      Date of Admission:  1/22/2025  Date of Discharge:  1/28/2025 11:28 AM  Discharging Provider: Betina Robledo MD  Discharge Service: Hospitalist Service    Discharge Diagnoses   Acute toxic encephalopathy  Unresponsive episode  Severe alcohol use disorder  Cannabis use  Hypotension due to hypovolemia  Hypokalemia  Hyponatremia  Anion gap metabolic acidosis likely starvation ketosis  Chronic neck pain  Acute urinary retention  Gross hematuria  Generalized weakness    Clinically Significant Risk Factors          Follow-ups Needed After Discharge   Follow-up Appointments       Follow Up and recommended labs and tests      Follow up with jail physician.  The following labs/tests are recommended: BMP, magnesium.  Follow up with Urology in 1-2 weeks for voiding trial                Unresulted Labs Ordered in the Past 30 Days of this Admission       No orders found from 12/23/2024 to 1/23/2025.        These results will be followed up by     Discharge Disposition   Discharged to short-term care facility  Condition at discharge: Stable    Hospital Course   Carlos Ivy is a 59 year old male with PMH of severe alcohol use disorder, HTN and mood disorder admitted for encephalopathy and hematuria.  Ethanol level was negative.and Utox was positive for cannabis.   Suspect toxic encephalopathy in the setting of alcohol and cannabis use.  Head CT and neck negative. EEG negative for seizure but showed diffuse slowing. Patient placed on high dose thiamine, FA and MVI. Patient placed on IVF given poor oral intake. Wise placed for acute urinary retention. Hematuria resolved.  Urology consulted and recommend discharge with wise and Flomax. Patient to follow up with urology outpatient for additional workup.  Discharged to TCU to continue therapies.      Consultations This Hospital Stay   CARE MANAGEMENT / SOCIAL WORK IP CONSULT  UROLOGY IP  CONSULT  PHYSICAL THERAPY ADULT IP CONSULT  UROLOGY IP CONSULT  PHYSICAL THERAPY ADULT IP CONSULT  OCCUPATIONAL THERAPY ADULT IP CONSULT    Code Status   Full Code    Time Spent on this Encounter   I, Betina Robledo MD, personally saw the patient today and spent greater than 30 minutes discharging this patient.       Betina Robledo MD  [address] [address]  Phone: [phone]  Fax: [phone]  ______________________________________________________________________    Physical Exam   Vital Signs: Temp: 98.4  F (36.9  C) Temp src: Oral BP: 114/60 Pulse: 75   Resp: 17 SpO2: 97 % O2 Device: None (Room air)    Weight: 173 lbs 15.09 oz  General Appearance: No acute distress  Respiratory: Easy respirations       Primary Care Physician   Rohan Diaz    Discharge Orders      General info for SNF    Length of Stay Estimate: Short Term Care: Estimated # of Days <30  Condition at Discharge: Improving  Level of care:skilled   Rehabilitation Potential: Good  Admission H&P remains valid and up-to-date: Yes  Recent Chemotherapy: N/A  Use Nursing Home Standing Orders: Yes     Mantoux instructions    Give two-step Mantoux (PPD) Per Facility Policy Yes     Follow Up and recommended labs and tests    Follow up with custodial physician.  The following labs/tests are recommended: BMP, magnesium.  Follow up with Urology in 1-2 weeks for voiding trial     Reason for your hospital stay    Alcohol withdrawal seizure     Quiles catheter    To straight gravity drainage. Change catheter every 2 weeks and PRN for leaking or decreased urine output with signs of bladder distention. DO NOT change catheter without a specific Provider order IF diagnosis of benign prostatic hypertrophy (BPH), neurogenic bladder, or other urological conditions     Activity - Up with assistive device     Activity - Up with nursing assistance     Full Code     Physical Therapy Adult  Consult    Evaluate and treat as clinically indicated.    Reason:  weakness, unsteady gait     Occupational Therapy Adult Consult    Evaluate and treat as clinically indicated.    Reason:  weakness, unsteady gait     Fall precautions     Diet    Follow this diet upon discharge: Current Diet:Orders Placed This Encounter      Regular Diet Adult       Significant Results and Procedures   Most Recent 3 CBC's:  Recent Labs   Lab Test 01/25/25  0730 01/24/25  0423 01/23/25  0616   WBC 6.1 6.4 9.0   HGB 11.2* 10.4* 13.5   MCV 97 93 93    148* 185     Most Recent 3 BMP's:  Recent Labs   Lab Test 01/28/25  0656 01/27/25  0656 01/26/25  0754 01/25/25  0730 01/24/25  0423 01/23/25  0901 01/23/25  0616   NA  --   --   --  140 136  --  134*   POTASSIUM 4.2 4.4 4.2 4.0 3.5   < > 3.7   CHLORIDE  --   --   --  112* 111*  --  99   CO2  --   --   --  19* 17*  --  19*   BUN  --   --   --  7.3 14.5  --  18.8   CR  --   --   --  0.72 0.76  --  0.72   ANIONGAP  --   --   --  9 8  --  16*   GEO  --   --   --  9.2 9.0  --  9.6   GLC  --   --   --  105* 127*  --  93    < > = values in this interval not displayed.     Most Recent 2 LFT's:  Recent Labs   Lab Test 01/25/25  0730 01/23/25  0616   AST 40 40   ALT 36 42   ALKPHOS 54 58   BILITOTAL 0.5 1.2   ,   Results for orders placed or performed during the hospital encounter of 01/22/25   CT Head w/o Contrast    Narrative    EXAM: CT HEAD W/O CONTRAST  LOCATION: [facility]  DATE: 1/22/2025    INDICATION: Transient confusion, alcoholic, recent falls.    COMPARISON: Head CT 7/8/2020.    TECHNIQUE: Routine CT head without intravenous contrast. Multiplanar reformats. Dose reduction techniques were used.    FINDINGS:  INTRACRANIAL CONTENTS: No intracranial hemorrhage, extra-axial collection, or mass effect. No CT evidence of acute infarct. Moderate presumed chronic small vessel ischemic changes. Moderate generalized volume loss. No hydrocephalus.     VISUALIZED  ORBITS/SINUSES/MASTOIDS: No intraorbital abnormality. No paranasal sinus mucosal disease. No middle ear or mastoid effusion.    BONES/SOFT TISSUES: No acute abnormality.      Impression    IMPRESSION:  1.  No CT evidence for acute intracranial process.  2.  Brain atrophy and presumed chronic microvascular ischemic changes as above.     CT Cervical Spine w/o Contrast    Narrative    EXAM: CT CERVICAL SPINE W/O CONTRAST  LOCATION: [facility]  DATE: 1/22/2025    INDICATION: Recent falls, alcoholic.  COMPARISON: None.  TECHNIQUE: Routine CT Cervical Spine without IV contrast. Multiplanar reformats. Dose reduction techniques were used.      Impression    IMPRESSION: Mild degenerative anterolisthesis of C4 upon C5. Alignment is otherwise normal; however, there is straightening of normal cervical lordosis. Vertebral body heights normal. No fractures. Scattered facet arthropathy throughout the cervical   spine. No definite high-grade spinal canal stenosis although evaluation is mildly limited by suboptimal imaging technique and artifact. No prevertebral soft tissue swelling.   CT Abdomen Pelvis w Contrast    Narrative    EXAM: CT ABDOMEN PELVIS W CONTRAST  LOCATION: [facility]  DATE: 1/22/2025    INDICATION: Assessment for gross hematuria  COMPARISON: 7/8/2020  TECHNIQUE: CT scan of the abdomen and pelvis was performed following injection of IV contrast. Multiplanar reformats were obtained. Dose reduction techniques were used.  CONTRAST: 90ml isovue 370    FINDINGS:   LOWER CHEST: Normal.    HEPATOBILIARY: Diffuse hepatic steatosis. No significant mass. No bile duct dilatation. No calcified gallstones.    PANCREAS: No significant mass, duct dilatation, or inflammatory change.    SPLEEN: Normal size.    ADRENAL GLANDS: No significant nodules.    KIDNEYS/BLADDER: The bilateral kidneys enhance symmetrically without evidence for hydronephrosis or pyelonephritis. No renal masses  or calculi noted. The bilateral ureters and urinary bladder are unremarkable.    BOWEL: Nonspecific nonobstructive bowel gas pattern. Appendix within normal limits.    LYMPH NODES: No lymphadenopathy.    VASCULATURE: No abdominal aortic aneurysm. Moderate vascular calcifications abdominal aorta with mild vascular calcifications common iliac arteries.    PELVIC ORGANS: No pelvic mass. No pelvic free fluid.    MUSCULOSKELETAL: Marked interval compression deformity of T11. Mild to moderate spondylosis thoracic and lumbar spines. Degenerative disc disease at the L5-S1 interspace. No inguinal hernias. No ventral hernia.      Impression    IMPRESSION:   1.  No CT explanation for the hematuria. No urinary calculi or hydronephrosis.  2.  Diffuse hepatic steatosis.  3.  Marked interval compression deformity of T11.         Discharge Medications   Discharge Medication List as of 1/28/2025 10:24 AM        START taking these medications    Details   acetaminophen (TYLENOL) 500 MG tablet Take 1 tablet (500 mg) by mouth every 6 hours as needed for mild pain., Transitional      folic acid (FOLVITE) 1 MG tablet Take 1 tablet (1 mg) by mouth daily., Transitional      gabapentin (NEURONTIN) 100 MG capsule Take 3 capsules (300 mg) by mouth 3 times daily for 1 day, THEN 1 capsule (100 mg) 3 times daily for 1 day., Transitional      multivitamin w/minerals (THERA-VIT-M) tablet Take 1 tablet by mouth daily., Transitional      tamsulosin (FLOMAX) 0.4 MG capsule Take 1 capsule (0.4 mg) by mouth every evening., Transitional      thiamine (B-1) 100 MG tablet Take 1 tablet (100 mg) by mouth daily., Transitional           Allergies   No Known Allergies

## 2025-01-28 NOTE — PLAN OF CARE
Problem: Adult Inpatient Plan of Care  Goal: Plan of Care Review  Description: The Plan of Care Review/Shift note should be completed every shift.  The Outcome Evaluation is a brief statement about your assessment that the patient is improving, declining, or no change.  This information will be displayed automatically on your shift  note.  Outcome: Met   Goal Outcome Evaluation:    Ready for transfer to TCU.  Scoring only a 2 on CIWA protocol for tremors which are baseline.  Potassium 4.2.  No replacement needed.  Able to follow instructions.  Discharging with wise catheter per MD orders.  In place for urinary retention.  Now on flomax.  Denies pain.  Picked up by [facility] transportation/wheelchair transport.

## 2025-01-28 NOTE — PROVIDER NOTIFICATION
Notified hospitalist about pt having wise catheter and is going to TCU-needs order to maintain for TCU.

## 2025-01-28 NOTE — PLAN OF CARE
Goal Outcome Evaluation:                 Problem: Seizure, Active Management  Goal: Absence of Seizure/Seizure-Related Injury  Outcome: Progressing     Problem: Comorbidity Management  Goal: Maintenance of Seizure Control  Outcome: Progressing       Pt is A/Ox4. On RA. Has a rash all over his body, MD is aware.States he has pain in his back. Regular diet. Take pills whole. Tremors Present. Independent in room. Can verbalize his needs. Call light within reach  No incidents noted.

## 2025-01-28 NOTE — PROGRESS NOTES
Care Management Discharge Note    Discharge Date: 01/28/2025       Discharge Disposition: Transitional Care    Discharge Services: None    Discharge DME: None    Discharge Transportation: agency    Private pay costs discussed: transportation costs    Does the patient's insurance plan have a 3 day qualifying hospital stay waiver?  No    PAS Confirmation Code: [id number]  Patient/family educated on Medicare website which has current facility and service quality ratings: yes    Education Provided on the Discharge Plan: Yes  Persons Notified of Discharge Plans: Pt and mom Nita  Patient/Family in Agreement with the Plan: yes    Handoff Referral Completed: No, handoff not indicated or clinically appropriate    Additional Information:  Pt to discharge to [facility] today 1/28 via [facility] wheelchair transport between 10:40-11:20AM. PAS completed.     SEMAJ Virk

## 2025-02-01 ENCOUNTER — LAB REQUISITION (OUTPATIENT)
Dept: LAB | Facility: CLINIC | Age: 60
End: 2025-02-01
Payer: COMMERCIAL

## 2025-02-01 DIAGNOSIS — E61.2 MAGNESIUM DEFICIENCY: ICD-10-CM

## 2025-02-01 DIAGNOSIS — R79.9 ABNORMAL FINDING OF BLOOD CHEMISTRY, UNSPECIFIED: ICD-10-CM

## 2025-02-04 ENCOUNTER — LAB REQUISITION (OUTPATIENT)
Dept: LAB | Facility: CLINIC | Age: 60
End: 2025-02-04
Payer: COMMERCIAL

## 2025-02-04 DIAGNOSIS — B41.0: ICD-10-CM

## 2025-02-04 DIAGNOSIS — E83.40 DISORDERS OF MAGNESIUM METABOLISM, UNSPECIFIED: ICD-10-CM

## 2025-02-04 DIAGNOSIS — Z13.228 ENCOUNTER FOR SCREENING FOR OTHER METABOLIC DISORDERS: ICD-10-CM

## 2025-02-04 LAB
ANION GAP SERPL CALCULATED.3IONS-SCNC: 10 MMOL/L (ref 7–15)
BUN SERPL-MCNC: 10.5 MG/DL (ref 6–20)
CALCIUM SERPL-MCNC: 9.5 MG/DL (ref 8.8–10.4)
CHLORIDE SERPL-SCNC: 108 MMOL/L (ref 98–107)
CREAT SERPL-MCNC: 1.09 MG/DL (ref 0.67–1.17)
EGFRCR SERPLBLD CKD-EPI 2021: 79 ML/MIN/1.73M2
GLUCOSE SERPL-MCNC: 102 MG/DL (ref 70–99)
HCO3 SERPL-SCNC: 23 MMOL/L (ref 22–29)
MAGNESIUM SERPL-MCNC: 2 MG/DL (ref 1.7–2.3)
POTASSIUM SERPL-SCNC: 4 MMOL/L (ref 3.4–5.3)
SODIUM SERPL-SCNC: 141 MMOL/L (ref 135–145)

## 2025-02-05 LAB
ALBUMIN SERPL BCG-MCNC: 3.4 G/DL (ref 3.5–5.2)
ALP SERPL-CCNC: 58 U/L (ref 40–150)
ALT SERPL W P-5'-P-CCNC: 106 U/L (ref 0–70)
ANION GAP SERPL CALCULATED.3IONS-SCNC: 10 MMOL/L (ref 7–15)
AST SERPL W P-5'-P-CCNC: 103 U/L (ref 0–45)
BILIRUB SERPL-MCNC: 0.4 MG/DL
BUN SERPL-MCNC: 11.9 MG/DL (ref 6–20)
CALCIUM SERPL-MCNC: 9.4 MG/DL (ref 8.8–10.4)
CHLORIDE SERPL-SCNC: 110 MMOL/L (ref 98–107)
CREAT SERPL-MCNC: 0.68 MG/DL (ref 0.67–1.17)
EGFRCR SERPLBLD CKD-EPI 2021: >90 ML/MIN/1.73M2
ERYTHROCYTE [DISTWIDTH] IN BLOOD BY AUTOMATED COUNT: 15.2 % (ref 10–15)
GLUCOSE SERPL-MCNC: 99 MG/DL (ref 70–99)
HCO3 SERPL-SCNC: 21 MMOL/L (ref 22–29)
HCT VFR BLD AUTO: 30.6 % (ref 40–53)
HGB BLD-MCNC: 9.9 G/DL (ref 13.3–17.7)
MAGNESIUM SERPL-MCNC: 2 MG/DL (ref 1.7–2.3)
MCH RBC QN AUTO: 31.1 PG (ref 26.5–33)
MCHC RBC AUTO-ENTMCNC: 32.4 G/DL (ref 31.5–36.5)
MCV RBC AUTO: 96 FL (ref 78–100)
PLATELET # BLD AUTO: 261 10E3/UL (ref 150–450)
POTASSIUM SERPL-SCNC: 3.8 MMOL/L (ref 3.4–5.3)
PROT SERPL-MCNC: 6.3 G/DL (ref 6.4–8.3)
RBC # BLD AUTO: 3.18 10E6/UL (ref 4.4–5.9)
SODIUM SERPL-SCNC: 141 MMOL/L (ref 135–145)
WBC # BLD AUTO: 6.7 10E3/UL (ref 4–11)

## 2025-02-05 PROCEDURE — 36415 COLL VENOUS BLD VENIPUNCTURE: CPT | Mod: ORL | Performed by: PHYSICIAN ASSISTANT

## 2025-02-05 PROCEDURE — 83735 ASSAY OF MAGNESIUM: CPT | Mod: ORL | Performed by: PHYSICIAN ASSISTANT

## 2025-02-05 PROCEDURE — 85027 COMPLETE CBC AUTOMATED: CPT | Mod: ORL | Performed by: PHYSICIAN ASSISTANT

## 2025-02-05 PROCEDURE — P9604 ONE-WAY ALLOW PRORATED TRIP: HCPCS | Mod: ORL | Performed by: PHYSICIAN ASSISTANT

## 2025-02-05 PROCEDURE — 80053 COMPREHEN METABOLIC PANEL: CPT | Mod: ORL | Performed by: PHYSICIAN ASSISTANT

## 2025-02-10 ENCOUNTER — HOSPITAL ENCOUNTER (INPATIENT)
Facility: HOSPITAL | Age: 60
LOS: 2 days | Discharge: NURSING, REHAB OR LONG-TERM CARE | End: 2025-02-13
Attending: EMERGENCY MEDICINE
Payer: COMMERCIAL

## 2025-02-10 ENCOUNTER — APPOINTMENT (OUTPATIENT)
Dept: RADIOLOGY | Facility: HOSPITAL | Age: 60
DRG: 897 | End: 2025-02-10
Attending: EMERGENCY MEDICINE
Payer: COMMERCIAL

## 2025-02-10 ENCOUNTER — MEDICAL CORRESPONDENCE (OUTPATIENT)
Dept: HEALTH INFORMATION MANAGEMENT | Facility: CLINIC | Age: 60
End: 2025-02-10

## 2025-02-10 DIAGNOSIS — R07.9 ACUTE CHEST PAIN: ICD-10-CM

## 2025-02-10 DIAGNOSIS — E56.9 VITAMIN DEFICIENCY: ICD-10-CM

## 2025-02-10 DIAGNOSIS — R41.82 ALTERED MENTAL STATUS, UNSPECIFIED ALTERED MENTAL STATUS TYPE: ICD-10-CM

## 2025-02-10 DIAGNOSIS — F10.90 ALCOHOL USE DISORDER: Primary | ICD-10-CM

## 2025-02-10 DIAGNOSIS — F10.220 ALCOHOL DEPENDENCE WITH ACUTE ALCOHOLIC INTOXICATION WITHOUT COMPLICATION (H): ICD-10-CM

## 2025-02-10 LAB
ALBUMIN SERPL BCG-MCNC: 3.7 G/DL (ref 3.5–5.2)
ALP SERPL-CCNC: 64 U/L (ref 40–150)
ALT SERPL W P-5'-P-CCNC: 102 U/L (ref 0–70)
ANION GAP SERPL CALCULATED.3IONS-SCNC: 10 MMOL/L (ref 7–15)
AST SERPL W P-5'-P-CCNC: 81 U/L (ref 0–45)
BASOPHILS # BLD AUTO: 0 10E3/UL (ref 0–0.2)
BASOPHILS NFR BLD AUTO: 0 %
BILIRUB SERPL-MCNC: 0.5 MG/DL
BUN SERPL-MCNC: 19.4 MG/DL (ref 6–20)
CALCIUM SERPL-MCNC: 9.8 MG/DL (ref 8.8–10.4)
CHLORIDE SERPL-SCNC: 106 MMOL/L (ref 98–107)
CREAT SERPL-MCNC: 0.71 MG/DL (ref 0.67–1.17)
EGFRCR SERPLBLD CKD-EPI 2021: >90 ML/MIN/1.73M2
EOSINOPHIL # BLD AUTO: 0 10E3/UL (ref 0–0.7)
EOSINOPHIL NFR BLD AUTO: 1 %
ERYTHROCYTE [DISTWIDTH] IN BLOOD BY AUTOMATED COUNT: 15.4 % (ref 10–15)
ETHANOL SERPL-MCNC: <0.01 G/DL
GLUCOSE SERPL-MCNC: 109 MG/DL (ref 70–99)
HCO3 SERPL-SCNC: 24 MMOL/L (ref 22–29)
HCT VFR BLD AUTO: 31.7 % (ref 40–53)
HGB BLD-MCNC: 10.4 G/DL (ref 13.3–17.7)
IMM GRANULOCYTES # BLD: 0 10E3/UL
IMM GRANULOCYTES NFR BLD: 1 %
INR PPP: 1.08 (ref 0.85–1.15)
LYMPHOCYTES # BLD AUTO: 2.5 10E3/UL (ref 0.8–5.3)
LYMPHOCYTES NFR BLD AUTO: 30 %
MAGNESIUM SERPL-MCNC: 1.9 MG/DL (ref 1.7–2.3)
MCH RBC QN AUTO: 30.9 PG (ref 26.5–33)
MCHC RBC AUTO-ENTMCNC: 32.8 G/DL (ref 31.5–36.5)
MCV RBC AUTO: 94 FL (ref 78–100)
MONOCYTES # BLD AUTO: 1 10E3/UL (ref 0–1.3)
MONOCYTES NFR BLD AUTO: 12 %
NEUTROPHILS # BLD AUTO: 4.8 10E3/UL (ref 1.6–8.3)
NEUTROPHILS NFR BLD AUTO: 57 %
NRBC # BLD AUTO: 0 10E3/UL
NRBC BLD AUTO-RTO: 0 /100
PLATELET # BLD AUTO: 196 10E3/UL (ref 150–450)
POTASSIUM SERPL-SCNC: 4.5 MMOL/L (ref 3.4–5.3)
PROT SERPL-MCNC: 6.7 G/DL (ref 6.4–8.3)
RBC # BLD AUTO: 3.37 10E6/UL (ref 4.4–5.9)
SODIUM SERPL-SCNC: 140 MMOL/L (ref 135–145)
TROPONIN T SERPL HS-MCNC: 13 NG/L
WBC # BLD AUTO: 8.4 10E3/UL (ref 4–11)

## 2025-02-10 PROCEDURE — 85025 COMPLETE CBC W/AUTO DIFF WBC: CPT | Performed by: EMERGENCY MEDICINE

## 2025-02-10 PROCEDURE — 85610 PROTHROMBIN TIME: CPT | Performed by: EMERGENCY MEDICINE

## 2025-02-10 PROCEDURE — 96360 HYDRATION IV INFUSION INIT: CPT | Mod: 59

## 2025-02-10 PROCEDURE — 82077 ASSAY SPEC XCP UR&BREATH IA: CPT | Performed by: EMERGENCY MEDICINE

## 2025-02-10 PROCEDURE — 36415 COLL VENOUS BLD VENIPUNCTURE: CPT | Performed by: EMERGENCY MEDICINE

## 2025-02-10 PROCEDURE — 258N000003 HC RX IP 258 OP 636: Performed by: EMERGENCY MEDICINE

## 2025-02-10 PROCEDURE — 84484 ASSAY OF TROPONIN QUANT: CPT | Performed by: EMERGENCY MEDICINE

## 2025-02-10 PROCEDURE — 71046 X-RAY EXAM CHEST 2 VIEWS: CPT

## 2025-02-10 PROCEDURE — 83735 ASSAY OF MAGNESIUM: CPT | Performed by: EMERGENCY MEDICINE

## 2025-02-10 PROCEDURE — 84295 ASSAY OF SERUM SODIUM: CPT | Performed by: EMERGENCY MEDICINE

## 2025-02-10 PROCEDURE — 93005 ELECTROCARDIOGRAM TRACING: CPT | Performed by: EMERGENCY MEDICINE

## 2025-02-10 PROCEDURE — 99285 EMERGENCY DEPT VISIT HI MDM: CPT | Mod: 25

## 2025-02-10 RX ADMIN — SODIUM CHLORIDE 500 ML: 0.9 INJECTION, SOLUTION INTRAVENOUS at 22:52

## 2025-02-10 ASSESSMENT — COLUMBIA-SUICIDE SEVERITY RATING SCALE - C-SSRS
1. IN THE PAST MONTH, HAVE YOU WISHED YOU WERE DEAD OR WISHED YOU COULD GO TO SLEEP AND NOT WAKE UP?: NO
2. HAVE YOU ACTUALLY HAD ANY THOUGHTS OF KILLING YOURSELF IN THE PAST MONTH?: NO
6. HAVE YOU EVER DONE ANYTHING, STARTED TO DO ANYTHING, OR PREPARED TO DO ANYTHING TO END YOUR LIFE?: NO

## 2025-02-10 ASSESSMENT — ACTIVITIES OF DAILY LIVING (ADL)
ADLS_ACUITY_SCORE: 58
ADLS_ACUITY_SCORE: 58

## 2025-02-10 ASSESSMENT — ENCOUNTER SYMPTOMS
SHORTNESS OF BREATH: 0
CHEST TIGHTNESS: 1

## 2025-02-11 ENCOUNTER — APPOINTMENT (OUTPATIENT)
Dept: CT IMAGING | Facility: HOSPITAL | Age: 60
DRG: 897 | End: 2025-02-11
Attending: EMERGENCY MEDICINE
Payer: COMMERCIAL

## 2025-02-11 PROBLEM — E51.2 WERNICKE ENCEPHALOPATHY: Status: ACTIVE | Noted: 2025-02-11

## 2025-02-11 PROBLEM — R21 RASH AND NONSPECIFIC SKIN ERUPTION: Status: ACTIVE | Noted: 2025-02-11

## 2025-02-11 PROBLEM — R07.9 ACUTE CHEST PAIN: Status: ACTIVE | Noted: 2025-02-11

## 2025-02-11 PROBLEM — F10.931 ALCOHOL WITHDRAWAL, WITH DELIRIUM (H): Status: ACTIVE | Noted: 2025-02-11

## 2025-02-11 PROBLEM — R41.82 ALTERED MENTAL STATUS, UNSPECIFIED ALTERED MENTAL STATUS TYPE: Status: ACTIVE | Noted: 2025-02-11

## 2025-02-11 LAB
ALBUMIN SERPL BCG-MCNC: 3.1 G/DL (ref 3.5–5.2)
ALBUMIN UR-MCNC: NEGATIVE MG/DL
ALP SERPL-CCNC: 51 U/L (ref 40–150)
ALT SERPL W P-5'-P-CCNC: 82 U/L (ref 0–70)
AMMONIA PLAS-SCNC: 23 UMOL/L (ref 16–60)
ANION GAP SERPL CALCULATED.3IONS-SCNC: 6 MMOL/L (ref 7–15)
APPEARANCE UR: CLEAR
AST SERPL W P-5'-P-CCNC: 57 U/L (ref 0–45)
BILIRUB SERPL-MCNC: 0.7 MG/DL
BILIRUB UR QL STRIP: NEGATIVE
BUN SERPL-MCNC: 11 MG/DL (ref 6–20)
CALCIUM SERPL-MCNC: 9.1 MG/DL (ref 8.8–10.4)
CHLORIDE SERPL-SCNC: 112 MMOL/L (ref 98–107)
COLOR UR AUTO: NORMAL
CREAT SERPL-MCNC: 0.6 MG/DL (ref 0.67–1.17)
CRP SERPL-MCNC: <3 MG/L
EGFRCR SERPLBLD CKD-EPI 2021: >90 ML/MIN/1.73M2
ERYTHROCYTE [DISTWIDTH] IN BLOOD BY AUTOMATED COUNT: 15.3 % (ref 10–15)
GLUCOSE SERPL-MCNC: 100 MG/DL (ref 70–99)
GLUCOSE UR STRIP-MCNC: NEGATIVE MG/DL
HCO3 SERPL-SCNC: 24 MMOL/L (ref 22–29)
HCT VFR BLD AUTO: 28.3 % (ref 40–53)
HGB BLD-MCNC: 9.5 G/DL (ref 13.3–17.7)
HGB UR QL STRIP: NEGATIVE
INR PPP: 1.14 (ref 0.85–1.15)
KETONES UR STRIP-MCNC: NEGATIVE MG/DL
LEUKOCYTE ESTERASE UR QL STRIP: NEGATIVE
MAGNESIUM SERPL-MCNC: 1.8 MG/DL (ref 1.7–2.3)
MCH RBC QN AUTO: 31.9 PG (ref 26.5–33)
MCHC RBC AUTO-ENTMCNC: 33.6 G/DL (ref 31.5–36.5)
MCV RBC AUTO: 95 FL (ref 78–100)
NITRATE UR QL: NEGATIVE
PH UR STRIP: 6 [PH] (ref 5–7)
PHOSPHATE SERPL-MCNC: 3.8 MG/DL (ref 2.5–4.5)
PLATELET # BLD AUTO: 171 10E3/UL (ref 150–450)
POTASSIUM SERPL-SCNC: 3.8 MMOL/L (ref 3.4–5.3)
PROCALCITONIN SERPL IA-MCNC: 0.06 NG/ML
PROT SERPL-MCNC: 5.7 G/DL (ref 6.4–8.3)
RBC # BLD AUTO: 2.98 10E6/UL (ref 4.4–5.9)
RBC URINE: 0 /HPF
SODIUM SERPL-SCNC: 142 MMOL/L (ref 135–145)
SP GR UR STRIP: 1.01 (ref 1–1.03)
TROPONIN T SERPL HS-MCNC: 12 NG/L
TSH SERPL DL<=0.005 MIU/L-ACNC: 1.63 UIU/ML (ref 0.3–4.2)
UROBILINOGEN UR STRIP-MCNC: <2 MG/DL
VIT B12 SERPL-MCNC: 457 PG/ML (ref 232–1245)
WBC # BLD AUTO: 6 10E3/UL (ref 4–11)
WBC URINE: 1 /HPF

## 2025-02-11 PROCEDURE — 83735 ASSAY OF MAGNESIUM: CPT | Performed by: HOSPITALIST

## 2025-02-11 PROCEDURE — 99223 1ST HOSP IP/OBS HIGH 75: CPT | Mod: AI | Performed by: HOSPITALIST

## 2025-02-11 PROCEDURE — 70450 CT HEAD/BRAIN W/O DYE: CPT

## 2025-02-11 PROCEDURE — 82140 ASSAY OF AMMONIA: CPT | Performed by: HOSPITALIST

## 2025-02-11 PROCEDURE — 84484 ASSAY OF TROPONIN QUANT: CPT | Performed by: EMERGENCY MEDICINE

## 2025-02-11 PROCEDURE — 81001 URINALYSIS AUTO W/SCOPE: CPT | Performed by: HOSPITALIST

## 2025-02-11 PROCEDURE — 258N000003 HC RX IP 258 OP 636: Performed by: HOSPITALIST

## 2025-02-11 PROCEDURE — 84145 PROCALCITONIN (PCT): CPT | Performed by: HOSPITALIST

## 2025-02-11 PROCEDURE — 82607 VITAMIN B-12: CPT | Performed by: PSYCHIATRY & NEUROLOGY

## 2025-02-11 PROCEDURE — 85610 PROTHROMBIN TIME: CPT | Performed by: HOSPITALIST

## 2025-02-11 PROCEDURE — 99221 1ST HOSP IP/OBS SF/LOW 40: CPT | Performed by: PSYCHIATRY & NEUROLOGY

## 2025-02-11 PROCEDURE — 84443 ASSAY THYROID STIM HORMONE: CPT | Performed by: HOSPITALIST

## 2025-02-11 PROCEDURE — 250N000011 HC RX IP 250 OP 636: Performed by: HOSPITALIST

## 2025-02-11 PROCEDURE — 84100 ASSAY OF PHOSPHORUS: CPT | Performed by: HOSPITALIST

## 2025-02-11 PROCEDURE — G0378 HOSPITAL OBSERVATION PER HR: HCPCS

## 2025-02-11 PROCEDURE — 85041 AUTOMATED RBC COUNT: CPT | Performed by: HOSPITALIST

## 2025-02-11 PROCEDURE — 120N000001 HC R&B MED SURG/OB

## 2025-02-11 PROCEDURE — 86140 C-REACTIVE PROTEIN: CPT | Performed by: HOSPITALIST

## 2025-02-11 PROCEDURE — 82565 ASSAY OF CREATININE: CPT | Performed by: HOSPITALIST

## 2025-02-11 PROCEDURE — 96361 HYDRATE IV INFUSION ADD-ON: CPT | Mod: 59

## 2025-02-11 PROCEDURE — 250N000013 HC RX MED GY IP 250 OP 250 PS 637: Performed by: HOSPITALIST

## 2025-02-11 PROCEDURE — 36415 COLL VENOUS BLD VENIPUNCTURE: CPT | Performed by: HOSPITALIST

## 2025-02-11 PROCEDURE — HZ2ZZZZ DETOXIFICATION SERVICES FOR SUBSTANCE ABUSE TREATMENT: ICD-10-PCS | Performed by: HOSPITALIST

## 2025-02-11 PROCEDURE — 36415 COLL VENOUS BLD VENIPUNCTURE: CPT | Performed by: EMERGENCY MEDICINE

## 2025-02-11 PROCEDURE — 250N000013 HC RX MED GY IP 250 OP 250 PS 637: Performed by: EMERGENCY MEDICINE

## 2025-02-11 RX ORDER — FLUMAZENIL 0.1 MG/ML
0.2 INJECTION, SOLUTION INTRAVENOUS
Status: DISCONTINUED | OUTPATIENT
Start: 2025-02-11 | End: 2025-02-13 | Stop reason: HOSPADM

## 2025-02-11 RX ORDER — GABAPENTIN 100 MG/1
100 CAPSULE ORAL EVERY 8 HOURS
Status: DISCONTINUED | OUTPATIENT
Start: 2025-02-18 | End: 2025-02-13 | Stop reason: HOSPADM

## 2025-02-11 RX ORDER — ENOXAPARIN SODIUM 100 MG/ML
40 INJECTION SUBCUTANEOUS EVERY 24 HOURS
Status: DISCONTINUED | OUTPATIENT
Start: 2025-02-11 | End: 2025-02-13 | Stop reason: HOSPADM

## 2025-02-11 RX ORDER — KETOCONAZOLE 20 MG/ML
SHAMPOO, SUSPENSION TOPICAL EVERY EVENING
COMMUNITY

## 2025-02-11 RX ORDER — ONDANSETRON 2 MG/ML
4 INJECTION INTRAMUSCULAR; INTRAVENOUS EVERY 6 HOURS PRN
Status: DISCONTINUED | OUTPATIENT
Start: 2025-02-11 | End: 2025-02-13 | Stop reason: HOSPADM

## 2025-02-11 RX ORDER — GUAIFENESIN 200 MG/10ML
200 LIQUID ORAL EVERY 4 HOURS PRN
Status: DISCONTINUED | OUTPATIENT
Start: 2025-02-11 | End: 2025-02-13 | Stop reason: HOSPADM

## 2025-02-11 RX ORDER — OLANZAPINE 5 MG/1
5-10 TABLET, ORALLY DISINTEGRATING ORAL EVERY 6 HOURS PRN
Status: DISCONTINUED | OUTPATIENT
Start: 2025-02-11 | End: 2025-02-13 | Stop reason: HOSPADM

## 2025-02-11 RX ORDER — AMOXICILLIN 250 MG
2 CAPSULE ORAL 2 TIMES DAILY PRN
Status: DISCONTINUED | OUTPATIENT
Start: 2025-02-11 | End: 2025-02-13 | Stop reason: HOSPADM

## 2025-02-11 RX ORDER — MULTIPLE VITAMINS W/ MINERALS TAB 9MG-400MCG
1 TAB ORAL ONCE
Status: COMPLETED | OUTPATIENT
Start: 2025-02-11 | End: 2025-02-11

## 2025-02-11 RX ORDER — LORAZEPAM 2 MG/ML
1-2 INJECTION INTRAMUSCULAR EVERY 30 MIN PRN
Status: DISCONTINUED | OUTPATIENT
Start: 2025-02-11 | End: 2025-02-13 | Stop reason: HOSPADM

## 2025-02-11 RX ORDER — AMOXICILLIN 250 MG
1 CAPSULE ORAL 2 TIMES DAILY PRN
Status: DISCONTINUED | OUTPATIENT
Start: 2025-02-11 | End: 2025-02-13 | Stop reason: HOSPADM

## 2025-02-11 RX ORDER — LORAZEPAM 0.5 MG/1
2 TABLET ORAL ONCE
Status: COMPLETED | OUTPATIENT
Start: 2025-02-11 | End: 2025-02-11

## 2025-02-11 RX ORDER — ACETAMINOPHEN 325 MG/1
650 TABLET ORAL EVERY 4 HOURS PRN
Status: DISCONTINUED | OUTPATIENT
Start: 2025-02-11 | End: 2025-02-13 | Stop reason: HOSPADM

## 2025-02-11 RX ORDER — HALOPERIDOL 5 MG/ML
2.5-5 INJECTION INTRAMUSCULAR EVERY 6 HOURS PRN
Status: DISCONTINUED | OUTPATIENT
Start: 2025-02-11 | End: 2025-02-13 | Stop reason: HOSPADM

## 2025-02-11 RX ORDER — GABAPENTIN 300 MG/1
900 CAPSULE ORAL EVERY 8 HOURS
Status: DISCONTINUED | OUTPATIENT
Start: 2025-02-11 | End: 2025-02-13 | Stop reason: HOSPADM

## 2025-02-11 RX ORDER — THIAMINE HYDROCHLORIDE 100 MG/ML
250 INJECTION, SOLUTION INTRAMUSCULAR; INTRAVENOUS DAILY
Status: DISCONTINUED | OUTPATIENT
Start: 2025-02-13 | End: 2025-02-13 | Stop reason: HOSPADM

## 2025-02-11 RX ORDER — MULTIPLE VITAMINS W/ MINERALS TAB 9MG-400MCG
1 TAB ORAL DAILY
Status: DISCONTINUED | OUTPATIENT
Start: 2025-02-12 | End: 2025-02-13 | Stop reason: HOSPADM

## 2025-02-11 RX ORDER — CLONIDINE HYDROCHLORIDE 0.1 MG/1
0.1 TABLET ORAL EVERY 8 HOURS
Status: DISCONTINUED | OUTPATIENT
Start: 2025-02-11 | End: 2025-02-12

## 2025-02-11 RX ORDER — HYDRALAZINE HYDROCHLORIDE 10 MG/1
10 TABLET, FILM COATED ORAL EVERY 4 HOURS PRN
Status: DISCONTINUED | OUTPATIENT
Start: 2025-02-11 | End: 2025-02-13 | Stop reason: HOSPADM

## 2025-02-11 RX ORDER — GABAPENTIN 300 MG/1
300 CAPSULE ORAL EVERY 8 HOURS
Status: DISCONTINUED | OUTPATIENT
Start: 2025-02-16 | End: 2025-02-13 | Stop reason: HOSPADM

## 2025-02-11 RX ORDER — FOLIC ACID 1 MG/1
1 TABLET ORAL DAILY
Status: DISCONTINUED | OUTPATIENT
Start: 2025-02-12 | End: 2025-02-13 | Stop reason: HOSPADM

## 2025-02-11 RX ORDER — ONDANSETRON 4 MG/1
4 TABLET, ORALLY DISINTEGRATING ORAL EVERY 6 HOURS PRN
Status: DISCONTINUED | OUTPATIENT
Start: 2025-02-11 | End: 2025-02-13 | Stop reason: HOSPADM

## 2025-02-11 RX ORDER — GABAPENTIN 300 MG/1
600 CAPSULE ORAL EVERY 8 HOURS
Status: DISCONTINUED | OUTPATIENT
Start: 2025-02-14 | End: 2025-02-13 | Stop reason: HOSPADM

## 2025-02-11 RX ORDER — DIPHENHYDRAMINE HCL 25 MG
25 CAPSULE ORAL ONCE
Status: COMPLETED | OUTPATIENT
Start: 2025-02-11 | End: 2025-02-11

## 2025-02-11 RX ORDER — LORAZEPAM 0.5 MG/1
1-2 TABLET ORAL EVERY 30 MIN PRN
Status: DISCONTINUED | OUTPATIENT
Start: 2025-02-11 | End: 2025-02-13 | Stop reason: HOSPADM

## 2025-02-11 RX ORDER — FOLIC ACID 1 MG/1
1 TABLET ORAL ONCE
Status: COMPLETED | OUTPATIENT
Start: 2025-02-11 | End: 2025-02-11

## 2025-02-11 RX ORDER — THIAMINE HYDROCHLORIDE 100 MG/ML
100 INJECTION, SOLUTION INTRAMUSCULAR; INTRAVENOUS ONCE
Status: DISCONTINUED | OUTPATIENT
Start: 2025-02-11 | End: 2025-02-11

## 2025-02-11 RX ORDER — HYDRALAZINE HYDROCHLORIDE 20 MG/ML
10 INJECTION INTRAMUSCULAR; INTRAVENOUS EVERY 4 HOURS PRN
Status: DISCONTINUED | OUTPATIENT
Start: 2025-02-11 | End: 2025-02-13 | Stop reason: HOSPADM

## 2025-02-11 RX ORDER — TAMSULOSIN HYDROCHLORIDE 0.4 MG/1
0.4 CAPSULE ORAL EVERY EVENING
Status: DISCONTINUED | OUTPATIENT
Start: 2025-02-11 | End: 2025-02-13 | Stop reason: HOSPADM

## 2025-02-11 RX ORDER — FOLIC ACID 5 MG/ML
1 INJECTION, SOLUTION INTRAMUSCULAR; INTRAVENOUS; SUBCUTANEOUS ONCE
Status: DISCONTINUED | OUTPATIENT
Start: 2025-02-11 | End: 2025-02-11

## 2025-02-11 RX ORDER — LIDOCAINE 40 MG/G
CREAM TOPICAL
Status: DISCONTINUED | OUTPATIENT
Start: 2025-02-11 | End: 2025-02-13 | Stop reason: HOSPADM

## 2025-02-11 RX ORDER — THIAMINE HYDROCHLORIDE 100 MG/ML
500 INJECTION, SOLUTION INTRAMUSCULAR; INTRAVENOUS 3 TIMES DAILY
Status: COMPLETED | OUTPATIENT
Start: 2025-02-11 | End: 2025-02-12

## 2025-02-11 RX ORDER — MULTIPLE VITAMINS W/ MINERALS TAB 9MG-400MCG
1 TAB ORAL ONCE
Status: DISCONTINUED | OUTPATIENT
Start: 2025-02-11 | End: 2025-02-11

## 2025-02-11 RX ORDER — CALCIUM CARBONATE 500 MG/1
1000 TABLET, CHEWABLE ORAL 4 TIMES DAILY PRN
Status: DISCONTINUED | OUTPATIENT
Start: 2025-02-11 | End: 2025-02-13 | Stop reason: HOSPADM

## 2025-02-11 RX ORDER — NICOTINE 21 MG/24HR
1 PATCH, TRANSDERMAL 24 HOURS TRANSDERMAL DAILY
Status: DISCONTINUED | OUTPATIENT
Start: 2025-02-11 | End: 2025-02-13 | Stop reason: HOSPADM

## 2025-02-11 RX ORDER — ACETAMINOPHEN 650 MG/1
650 SUPPOSITORY RECTAL EVERY 4 HOURS PRN
Status: DISCONTINUED | OUTPATIENT
Start: 2025-02-11 | End: 2025-02-13 | Stop reason: HOSPADM

## 2025-02-11 RX ORDER — HALOPERIDOL 5 MG/1
5 TABLET ORAL ONCE
Status: COMPLETED | OUTPATIENT
Start: 2025-02-11 | End: 2025-02-11

## 2025-02-11 RX ORDER — GABAPENTIN 300 MG/1
1200 CAPSULE ORAL ONCE
Status: COMPLETED | OUTPATIENT
Start: 2025-02-11 | End: 2025-02-11

## 2025-02-11 RX ADMIN — NICOTINE POLACRILEX 2 MG: 2 GUM, CHEWING ORAL at 03:27

## 2025-02-11 RX ADMIN — HALOPERIDOL 5 MG: 5 TABLET ORAL at 03:17

## 2025-02-11 RX ADMIN — GABAPENTIN 900 MG: 300 CAPSULE ORAL at 10:42

## 2025-02-11 RX ADMIN — THIAMINE HYDROCHLORIDE 500 MG: 100 INJECTION, SOLUTION INTRAMUSCULAR; INTRAVENOUS at 19:42

## 2025-02-11 RX ADMIN — LORAZEPAM 2 MG: 0.5 TABLET ORAL at 03:17

## 2025-02-11 RX ADMIN — CLONIDINE HYDROCHLORIDE 0.1 MG: 0.1 TABLET ORAL at 19:43

## 2025-02-11 RX ADMIN — FAMOTIDINE 20 MG: 10 INJECTION, SOLUTION INTRAVENOUS at 19:42

## 2025-02-11 RX ADMIN — THIAMINE HYDROCHLORIDE 500 MG: 100 INJECTION, SOLUTION INTRAMUSCULAR; INTRAVENOUS at 14:29

## 2025-02-11 RX ADMIN — ENOXAPARIN SODIUM 40 MG: 40 INJECTION SUBCUTANEOUS at 10:41

## 2025-02-11 RX ADMIN — CLONIDINE HYDROCHLORIDE 0.1 MG: 0.1 TABLET ORAL at 10:42

## 2025-02-11 RX ADMIN — DIPHENHYDRAMINE HYDROCHLORIDE 25 MG: 25 CAPSULE ORAL at 03:17

## 2025-02-11 RX ADMIN — GABAPENTIN 1200 MG: 300 CAPSULE ORAL at 03:26

## 2025-02-11 RX ADMIN — SODIUM CHLORIDE 1000 ML: 9 INJECTION, SOLUTION INTRAVENOUS at 04:03

## 2025-02-11 RX ADMIN — LORAZEPAM 2 MG: 2 INJECTION INTRAMUSCULAR; INTRAVENOUS at 10:41

## 2025-02-11 RX ADMIN — TAMSULOSIN HYDROCHLORIDE 0.4 MG: 0.4 CAPSULE ORAL at 19:43

## 2025-02-11 RX ADMIN — GABAPENTIN 900 MG: 300 CAPSULE ORAL at 19:43

## 2025-02-11 RX ADMIN — LORAZEPAM 1 MG: 0.5 TABLET ORAL at 23:15

## 2025-02-11 RX ADMIN — Medication 1 TABLET: at 03:17

## 2025-02-11 RX ADMIN — THIAMINE HCL TAB 100 MG 100 MG: 100 TAB at 03:16

## 2025-02-11 RX ADMIN — FOLIC ACID 1 MG: 1 TABLET ORAL at 03:16

## 2025-02-11 RX ADMIN — CLONIDINE HYDROCHLORIDE 0.1 MG: 0.1 TABLET ORAL at 03:26

## 2025-02-11 ASSESSMENT — ACTIVITIES OF DAILY LIVING (ADL)
ADLS_ACUITY_SCORE: 60
ADLS_ACUITY_SCORE: 58
ADLS_ACUITY_SCORE: 58
ADLS_ACUITY_SCORE: 60
ADLS_ACUITY_SCORE: 58
ADLS_ACUITY_SCORE: 58
ADLS_ACUITY_SCORE: 60
ADLS_ACUITY_SCORE: 58
ADLS_ACUITY_SCORE: 60
ADLS_ACUITY_SCORE: 58

## 2025-02-11 NOTE — ED NOTES
Ambulated patient to bathroom with the use of a walker. Patient tolerated fine with no issues noted.

## 2025-02-11 NOTE — PLAN OF CARE
Problem: Adult Inpatient Plan of Care  Goal: Absence of Hospital-Acquired Illness or Injury  Intervention: Identify and Manage Fall Risk  Recent Flowsheet Documentation  Taken 2/11/2025 0800 by Janet Vaca RN  Safety Promotion/Fall Prevention:   nonskid shoes/slippers when out of bed   room door open  Intervention: Prevent Skin Injury  Recent Flowsheet Documentation  Taken 2/11/2025 0800 by Janet Vaca RN  Body Position: position changed independently  Goal: Optimal Comfort and Wellbeing  Intervention: Provide Person-Centered Care  Recent Flowsheet Documentation  Taken 2/11/2025 0800 by Janet Vaca RN  Trust Relationship/Rapport:   care explained   choices provided     Problem: Skin Injury Risk Increased  Goal: Skin Health and Integrity  Intervention: Optimize Skin Protection  Recent Flowsheet Documentation  Taken 2/11/2025 0800 by Janet Vaca RN  Activity Management: activity adjusted per tolerance   Goal Outcome Evaluation:    VSS Denies pain. Woke from sleep with CIWA 13. Administered 10mg Valium per protocol with good effect noted, CIWA 0 at recheck. Mother bedside. Staff anticipate needs.

## 2025-02-11 NOTE — ED NOTES
Bed: JNED-18  Expected date:   Expected time:   Means of arrival:   Comments:  Eduardo 59M increased confusion

## 2025-02-11 NOTE — PHARMACY-ADMISSION MEDICATION HISTORY
Pharmacist Admission Medication History    Admission medication history is complete. The information provided in this note is only as accurate as the sources available at the time of the update.    Information Source(s): Facility ([facility]/) medication list/MAR -- [facility] [place]    Pertinent Information: None    Changes made to PTA medication list:  Added: ketoconazole shampoo  Deleted: gabapentin  Changed: None    Allergies reviewed with patient and updates made in EHR: yes    Medication History Completed By: Isela Boykin [facility] 2/11/2025 7:54 AM    PTA Med List   Medication Sig Note Last Dose/Taking    acetaminophen (TYLENOL) 500 MG tablet Take 1 tablet (500 mg) by mouth every 6 hours as needed for mild pain.  Taking As Needed    folic acid (FOLVITE) 1 MG tablet Take 1 tablet (1 mg) by mouth daily.  2/10/2025 Morning    ketoconazole (NIZORAL) 2 % external shampoo Apply topically every evening. To beard x2 weeks for seborrheic dermatitis 2/11/2025: Through 2/13/25 2/10/2025 Evening    multivitamin w/minerals (THERA-VIT-M) tablet Take 1 tablet by mouth daily.  2/10/2025    tamsulosin (FLOMAX) 0.4 MG capsule Take 1 capsule (0.4 mg) by mouth every evening.  2/10/2025 Evening    thiamine (B-1) 100 MG tablet Take 1 tablet (100 mg) by mouth daily.  2/10/2025 Morning

## 2025-02-11 NOTE — ED PROVIDER NOTES
Emergency Department Encounter      NAME: Carlos Ivy  AGE: 59 year old male  YOB: 1965  MRN: [id number]  EVALUATION DATE & TIME: 2/10/2025  9:45 PM    PCP: Rohan Diaz    ED PROVIDER: Franck Bearden M.D.      Chief Complaint   Patient presents with    Altered Mental Status         FINAL IMPRESSION:  1. Altered mental status, unspecified altered mental status type    2. Acute chest pain        MEDICAL DECISION MAKING:    10:25 PM I met with the patient, obtained history, performed an initial exam, and discussed options and plan for diagnostics and treatment here in the ED.   2:58 AM I rechecked patient and notified him plan for admission.  3:08 AM I spoke with Dr. Tate the hospitalist. Admitted patient.     This patient is a 59-year-old male with a history of alcoholism who presents with confusion from his assisted living facility.  The patient himself was a poor historian and oriented to person but did not know where he was and did not know the month or year.  He did not recall the Super Bowl being yesterday.  He did say that he had some chest tightness today which she rated 6 out of 10 but no other symptoms.  He mentioned that he had some difficulty with balance but was unable to clarify that.  An EKG was done which showed sinus rhythm and no sign of ST elevation MI or cardiac ischemia.  A head CT was done which did not show any acute intracranial process.  I independently reviewed and interpreted the CT.  A chest x-ray was done which did not show any evidence of pneumonia.  His initial troponin was 12 and his repeat was 13 in 2 hours.  His alcohol level was 0 today.  Although his transaminases were slightly elevated his INR was 1.08.  The patient became more confused and agitated during the evening requiring sedation.  He was given oral Benadryl, Haldol and Ativan which worked well.  I admitted the patient to the hospitalist service because of the altered mental status but he will be on  a cardiac telemetry bed because of his complaints of chest pain.    Pertinent Labs & Imaging studies reviewed. (See chart for details)    Medical Decision Making     History:  Supplemental history from: Documented in chart, if applicable  External Record(s) reviewed: Documented in chart, if applicable.     Work Up:  Chart documentation includes differential considered and any EKGs or imaging independently interpreted by provider, where specified.  In additional to work up documented, I considered the following work up: Documented in chart, if applicable.     External consultation:  Discussion of management with another provider: Documented in chart, if applicable     Complicating factors:  Care impacted by chronic illness: hypertension, toxic encephalopathy, alcohol use disorder, anxiety, depression  Care affected by social determinants of health: Access to Medical Care     Disposition considerations: Admit          MEDICATIONS GIVEN IN THE EMERGENCY:  Medications   haloperidol (HALDOL) tablet 5 mg (has no administration in time range)   LORazepam (ATIVAN) tablet 2 mg (has no administration in time range)   diphenhydrAMINE (BENADRYL) capsule 25 mg (has no administration in time range)   multivitamin w/minerals (THERA-VIT-M) tablet 1 tablet (has no administration in time range)   thiamine (B-1) tablet 100 mg (has no administration in time range)   folic acid (FOLVITE) tablet 1 mg (has no administration in time range)   sodium chloride 0.9% BOLUS 500 mL (0 mLs Intravenous Stopped 2/11/25 0035)       NEW PRESCRIPTIONS STARTED AT TODAY'S ER VISIT:  New Prescriptions    No medications on file          =================================================================    HPI    Patient information was obtained from: patient     Use of : N/A         Carlos Ivy is a 59 year old male with a past medical history of hypertension, toxic encephalopathy, alcohol use disorder, anxiety, depression, who presents for  "altered mental status.     When asked if he comes from an assisted living facility, patient said \"if you say so.\" He reports that he thought he had a heart attack but does now know why. He believes that he could pull through if he did. He reports that he could have fallen and hit his head recently. Patient noted to have weakness and trouble with his balance but denies needing to hold onto things to keep him upright. He reports having some centralized chest tightness that has been ongoing over the past day. Rates tightness 6/10. He also said he does not remember if he is eating much. Patient said he lives in a unit by himself.     Patient denies vision changes and his arms and legs feel ok. No breathing problems.     Per chart review, patient presented to [facility] ED on 1/22/2025 for altered mental status. Labs remarkable for alcohol level negative, possible starvation/alcoholic ketosis. Imaging CT head and Cspine negative for acute pathology.  EKG showed sinus tach, rate 105 with occasional PVC. Patient admitted for toxic encephalopathy and hematuria. Hospital course was notable for Utox positive for cannabis. EEG negative for seizure but showed diffuse slowing. Treated with high dose thiamine, FA and MVI and was placed on IVF due to poor oral intake. Hematuria resolved. Patient discharged in stable condition on 1/28/2025 with urology outpatient follow up.      REVIEW OF SYSTEMS   Review of Systems   Respiratory:  Positive for chest tightness. Negative for shortness of breath.         PAST MEDICAL HISTORY:  Past Medical History:   Diagnosis Date    Alcohol abuse     Anxiety     Back pain, chronic     Depression     Hypertension     Substance abuse (H)        PAST SURGICAL HISTORY:  Past Surgical History:   Procedure Laterality Date    EXTRACTION(S) DENTAL      TONSILLECTOMY         CURRENT MEDICATIONS:      Current Facility-Administered Medications:     diphenhydrAMINE (BENADRYL) capsule 25 mg, 25 mg, Oral, Once, " Franck Bearden MD    folic acid (FOLVITE) tablet 1 mg, 1 mg, Oral, Once, Franck Bearden MD    haloperidol (HALDOL) tablet 5 mg, 5 mg, Oral, Once, Franck Bearden MD    LORazepam (ATIVAN) tablet 2 mg, 2 mg, Oral, Once, Franck Bearden MD    multivitamin w/minerals (THERA-VIT-M) tablet 1 tablet, 1 tablet, Oral, Once, Franck Bearden MD    thiamine (B-1) tablet 100 mg, 100 mg, Oral, Once, Franck Bearden MD    Current Outpatient Medications:     acetaminophen (TYLENOL) 500 MG tablet, Take 1 tablet (500 mg) by mouth every 6 hours as needed for mild pain., Disp: , Rfl:     folic acid (FOLVITE) 1 MG tablet, Take 1 tablet (1 mg) by mouth daily., Disp: , Rfl:     gabapentin (NEURONTIN) 100 MG capsule, Take 3 capsules (300 mg) by mouth 3 times daily for 1 day, THEN 1 capsule (100 mg) 3 times daily for 1 day., Disp: , Rfl:     multivitamin w/minerals (THERA-VIT-M) tablet, Take 1 tablet by mouth daily., Disp: , Rfl:     tamsulosin (FLOMAX) 0.4 MG capsule, Take 1 capsule (0.4 mg) by mouth every evening., Disp: , Rfl:     thiamine (B-1) 100 MG tablet, Take 1 tablet (100 mg) by mouth daily., Disp: , Rfl:     ALLERGIES:  No Known Allergies    FAMILY HISTORY:  No family history on file.    SOCIAL HISTORY:   Social History     Socioeconomic History    Marital status: Single   Tobacco Use    Smoking status: Every Day     Current packs/day: 1.00     Average packs/day: 1 pack/day for 20.0 years (20.0 ttl pk-yrs)     Types: Cigarettes    Smokeless tobacco: Never   Substance and Sexual Activity    Alcohol use: Yes     Comment: daily    Drug use: No     Types: Marijuana     Comment: occasional marijuana; occasional Vicodin (not prescribed)     Social Drivers of Health     Financial Resource Strain: Unknown (1/22/2025)    Financial Resource Strain     Within the past 12 months, have you or your family members you live with been unable to get utilities (heat, electricity) when it was really needed?: Patient unable  to answer   Food Insecurity: Unknown (1/22/2025)    Food Insecurity     Within the past 12 months, did you worry that your food would run out before you got money to buy more?: Patient unable to answer     Within the past 12 months, did the food you bought just not last and you didn t have money to get more?: Patient unable to answer   Transportation Needs: Unknown (1/22/2025)    Transportation Needs     Within the past 12 months, has lack of transportation kept you from medical appointments, getting your medicines, non-medical meetings or appointments, work, or from getting things that you need?: Patient unable to answer    Received from [facility]    Social Connections   Interpersonal Safety: Low Risk  (1/23/2025)    Interpersonal Safety     Do you feel physically and emotionally safe where you currently live?: Yes     Within the past 12 months, have you been hit, slapped, kicked or otherwise physically hurt by someone?: No     Within the past 12 months, have you been humiliated or emotionally abused in other ways by your partner or ex-partner?: No   Housing Stability: Low Risk  (1/22/2025)    Housing Stability     Do you have housing? : Yes     Are you worried about losing your housing?: Patient unable to answer       PHYSICAL EXAM:    Vitals: /85   Pulse 86   Temp 98.5  F (36.9  C) (Oral)   Resp 30   Ht 1.829 m (6')   Wt 86.2 kg (190 lb)   SpO2 99%   BMI 25.77 kg/m     Gen:  Alert, awake, NAD  HENT:  Head atraumatic, normocephalic.  PERRL.  EOMI.  No periorbital step-offs, depression, tenderness.  No tenderness along the zygomatic arch bilaterally.  Ears atraumatic with no external bleeding or signs of trauma.  No epistaxis.  Clear oropharynx.  Dentition intact. Bruising around right eye.   Respiratory:  Normal respiratory rate.  Lungs CTA.  Chest wall stable to compression.  Nontender chest wall.   Trachea midline.  Cardiovascular:  Regular rate and rhythm.   Palpable radial and DP pulses bilaterally.  Abdomen:  Soft, nontender, normoactive bowel sounds.    Musculoskeletal:  No midline C-spine, T-spine, L-spine tenderness.  No midline spinal step-offs noted.  FROM in all extremities.  5/5 strength in all extremities.  No gross deformities noted.  Pelvis stable.    Integument:  No ecchymosis, abrasions, hematomas, lacerations noted.    Neuro:  GCS 15, sensation intact to light touch, alert, not oriented to place or time.      LAB:  All pertinent labs reviewed and interpreted.  Labs Ordered and Resulted from Time of ED Arrival to Time of ED Departure   COMPREHENSIVE METABOLIC PANEL - Abnormal       Result Value    Sodium 140      Potassium 4.5      Carbon Dioxide (CO2) 24      Anion Gap 10      Urea Nitrogen 19.4      Creatinine 0.71      GFR Estimate >90      Calcium 9.8      Chloride 106      Glucose 109 (*)     Alkaline Phosphatase 64      AST 81 (*)      (*)     Protein Total 6.7      Albumin 3.7      Bilirubin Total 0.5     CBC WITH PLATELETS AND DIFFERENTIAL - Abnormal    WBC Count 8.4      RBC Count 3.37 (*)     Hemoglobin 10.4 (*)     Hematocrit 31.7 (*)     MCV 94      MCH 30.9      MCHC 32.8      RDW 15.4 (*)     Platelet Count 196      % Neutrophils 57      % Lymphocytes 30      % Monocytes 12      % Eosinophils 1      % Basophils 0      % Immature Granulocytes 1      NRBCs per 100 WBC 0      Absolute Neutrophils 4.8      Absolute Lymphocytes 2.5      Absolute Monocytes 1.0      Absolute Eosinophils 0.0      Absolute Basophils 0.0      Absolute Immature Granulocytes 0.0      Absolute NRBCs 0.0     INR - Normal    INR 1.08     ETHYL ALCOHOL LEVEL - Normal    Alcohol ethyl <0.01     MAGNESIUM - Normal    Magnesium 1.9     TROPONIN T, HIGH SENSITIVITY - Normal    Troponin T, High Sensitivity 13     TROPONIN T, HIGH SENSITIVITY - Normal    Troponin T, High Sensitivity 12         RADIOLOGY:  XR Chest 2 Views   Final Result   IMPRESSION: No acute abnormality.       CT Head w/o Contrast   Final Result   IMPRESSION:   No acute intracranial process.             EKG:   Performed at: 10-Feb-2025 at 22:39   Impression: Sinus rhythm, normal ECG  Rate: 84 bpm  Rhythm: Sinus  QRS Interval: 78 ms  QTc Interval: 467 ms  Comparison: Compared with EKG from 22-Jan-2025  I have independently reviewed and interpreted the EKG(s) documented above.         I, Tamara Tucker am serving as a scribe to document services personally performed by Dr. Franck Bearden based on my observation and the provider's statements to me. I, Franck Bearden M.D. attest that Tamara Ceballos is acting in a scribe capacity, has observed my performance of the services and has documented them in accordance with my direction.      Franck Bearden M.D.  Emergency Medicine  [facility] EMERGENCY DEPARTMENT  [facility] [facility] [address]  [phone]  Dept: [phone]     Franck Bearden MD  02/11/25 0528

## 2025-02-11 NOTE — H&P
"[facility]    History and Physical - Hospitalist Service       Date of Admission:  2/10/2025    Assessment & Plan      Carlos Ivy is a 59 year old male admitted on 2/10/2025.     Principal Problem:    Wernicke encephalopathy  Active Problems:    Alcohol dependence with acute alcoholic intoxication (H)    HTN (hypertension)    Altered mental status, unspecified altered mental status type    Acute chest pain    Alcohol withdrawal, with delirium (H)    Rash and nonspecific skin eruption          AMS with Wernicke encephalopathy with Alcohol dependence  Altered mental status, unspecified altered mental status type   Probable differential diagnosis are Infections, Metabolic/Electrolyte abnormalities or even components of psychological depression  -Concern for Warnicke encephalopathy, no meningeal signs CRP Pro-Deon normal  -Dehydrated without any acute findings suggesting predominantly alcohol dependence, patient is asking for \"booze\" in the ED. reports last drink was unclear-but also concern for delirium as well alcohol withdrawal -   Admit and provide withdrawal treatment and detoxification per protocol-monitor CIWA scale and provide Ativan per scoring on the scale.  Provide scheduled Gabapentin tapering in the initial phase to let it self taper off with time.  Also provide thiamine and folic acid and multivitamins to prevent any associated encephalopathy and repletion with continued IV fluids.  Continue monitoring.    Will also consult chemical dependency team and  for further counseling and findings resources for counseling and AA program associations, after acute monitoring and stabilization/detoxification.        Acute chest pain-possibly alcohol mediated gastritis  ruled out acute coronary syndrome monitor troponins monitor on telemetry.    obtain echocardiogram as well.  -GERD is also in differential and will provide Protonix - but has cardiac risk factors as " well.      Diffuse pruritic rash on the trunk and both upper arms.-Appears to be hypersensitivity reaction atopic dermatitis from type I..  CRP and Pro-Deon are normal and less likely to be inflammatory in nature, topical creams or Benadryl  -Pictures below      Chronic Medical problems-Hold outside hospital medication pending a confirmed pharmacy history and further reconciliation.  Initial orders were placed.  Request consultation to CD/SW-appreciated assistance and recommendations. .  Anticipated length of stay of more than 2 midnights of hospitalization depending on progression, planning,findings,further testing or treatment needs. Services are medically necessary for evaluation and treatment of the acute & on chronic superimposed conditions with the treatment plan as outlined above.  Current problem list also has been updated.          Diet: Combination Diet Regular Diet Adult    DVT Prophylaxis: Enoxaparin (Lovenox) SQ  Quiles Catheter: Not present  Lines: None     Cardiac Monitoring: ACTIVE order. Indication: QTc prolonging medication (48 hours)  Code Status: Full Code      Clinically Significant Risk Factors Present on Admission                   # Hypertension: Noted on problem list      # Anemia: based on hgb <11       # Overweight: Estimated body mass index is 25.77 kg/m  as calculated from the following:    Height as of this encounter: 1.829 m (6').    Weight as of this encounter: 86.2 kg (190 lb).         # Financial/Environmental Concerns:           Disposition Plan     Medically Ready for Discharge: Anticipated Tomorrow           Francisco Javier Tate MD  Hospitalist Service  [facility]  Securely message with [employer] (more info)  Text page via [facility] Paging/Directory     ______________________________________________________________________    Chief Complaint   AMS    History is obtained from the patient, electronic health record, and emergency department physician    History of Present  "Illness   Carlos Ivy is a 59 year old male with a past medical history of hypertension, toxic encephalopathy, alcohol use disorder, anxiety, depression, who presented for altered mental status.      When asked if he comes from an assisted living facility, patient said \"if you say so.\" He reports that he thought he had a heart attack but does now know why. He believes that he could pull through if he did. He reports that he could have fallen and hit his head recently. Patient noted to have weakness and trouble with his balance but denies needing to hold onto things to keep him upright. He reports having some centralized chest tightness that has been ongoing over the past day. Rates tightness 6/10. He also said he does not remember if he is eating much. Patient said he lives in a unit by himself.      Patient denies vision changes and his arms and legs feel ok. No breathing problems.      Per chart review, patient presented to [facility] ED on 1/22/2025 for altered mental status. Labs remarkable for alcohol level negative, possible starvation/alcoholic ketosis. Imaging CT head and Cspine negative for acute pathology.  EKG showed sinus tach, rate 105 with occasional PVC. Patient admitted for toxic encephalopathy and hematuria. Hospital course was notable for Utox positive for cannabis. EEG negative for seizure but showed diffuse slowing. Treated with high dose thiamine, FA and MVI and was placed on IVF due to poor oral intake. Hematuria resolved. Patient discharged in stable condition on 1/28/2025 with urology outpatient follow up.         Past Medical History    Past Medical History:   Diagnosis Date    Alcohol abuse     Anxiety     Back pain, chronic     Depression     Hypertension     Substance abuse (H)        Past Surgical History   Past Surgical History:   Procedure Laterality Date    EXTRACTION(S) DENTAL      TONSILLECTOMY         Prior to Admission Medications   Prior to Admission Medications   Prescriptions " Last Dose Informant Patient Reported? Taking?   acetaminophen (TYLENOL) 500 MG tablet   No No   Sig: Take 1 tablet (500 mg) by mouth every 6 hours as needed for mild pain.   folic acid (FOLVITE) 1 MG tablet   No No   Sig: Take 1 tablet (1 mg) by mouth daily.   gabapentin (NEURONTIN) 100 MG capsule   No No   Sig: Take 3 capsules (300 mg) by mouth 3 times daily for 1 day, THEN 1 capsule (100 mg) 3 times daily for 1 day.   multivitamin w/minerals (THERA-VIT-M) tablet   No No   Sig: Take 1 tablet by mouth daily.   tamsulosin (FLOMAX) 0.4 MG capsule   No No   Sig: Take 1 capsule (0.4 mg) by mouth every evening.   thiamine (B-1) 100 MG tablet   No No   Sig: Take 1 tablet (100 mg) by mouth daily.      Facility-Administered Medications: None        Review of Systems    Cannot be obtained due to patient's altered mental status    Social History   I have reviewed this patient's social history and updated it with pertinent information if needed.  Social History     Tobacco Use    Smoking status: Every Day     Current packs/day: 1.00     Average packs/day: 1 pack/day for 20.0 years (20.0 ttl pk-yrs)     Types: Cigarettes    Smokeless tobacco: Never   Substance Use Topics    Alcohol use: Yes     Comment: daily    Drug use: No     Types: Marijuana     Comment: occasional marijuana; occasional Vicodin (not prescribed)       Allergies   No Known Allergies     Physical Exam   Vital Signs: Temp: 98.5  F (36.9  C) Temp src: Oral BP: (!) 144/93 Pulse: 82   Resp: 30 SpO2: 99 % O2 Device: None (Room air)    Weight: 190 lbs 0 oz    Alert awake-confused and disoriented of breath and asking for alcohol in the ED  Vision Baseline  Neck supple  Oral mucosa  dry and dehydrated  bilateral air entry heard,  S1-S2 normal  Abdomen is soft no tenderness  Extremities are fully mobile and there is no visible swelling noted  No skin cyanosis-pruritus as below  Neurologically- no new Gross deficits from baseline-Moving all 4 extremities  Psych-mood  okay and appropriate to circumstances  Media Information      Medical Decision Making       75 MINUTES SPENT BY ME on the date of service doing chart review, history, exam, documentation & further activities per the note.      Data     I have personally reviewed the following data over the past 24 hrs:    8.4  \   10.4 (L)   / 196     140 106 19.4 /  109 (H)   4.5 24 0.71 \     ALT: 102 (H) AST: 81 (H) AP: 64 TBILI: 0.5   ALB: 3.7 TOT PROTEIN: 6.7 LIPASE: N/A     Trop: 12 BNP: N/A     TSH: 1.63 T4: N/A A1C: N/A     Procal: 0.06 CRP: <3.00 Lactic Acid: N/A       INR:  1.08 PTT:  N/A   D-dimer:  N/A Fibrinogen:  N/A       Imaging results reviewed over the past 24 hrs:   Recent Results (from the past 24 hours)   CT Head w/o Contrast    Narrative    EXAM: CT HEAD W/O CONTRAST  LOCATION: [facility]  DATE: 2/11/2025    INDICATION: confusion  COMPARISON: 1/22/2025  TECHNIQUE: Routine CT Head without IV contrast. Multiplanar reformats. Dose reduction techniques were used.    FINDINGS:  INTRACRANIAL CONTENTS: No intracranial hemorrhage, extraaxial collection, or mass effect.  No CT evidence of acute infarct. Mild to moderate presumed chronic small vessel ischemic changes. Moderate generalized volume loss. No hydrocephalus.     VISUALIZED ORBITS/SINUSES/MASTOIDS: No intraorbital abnormality. No paranasal sinus mucosal disease. No middle ear or mastoid effusion.    BONES/SOFT TISSUES: No acute abnormality.      Impression    IMPRESSION:  No acute intracranial process.     XR Chest 2 Views    Narrative    EXAM: XR CHEST 2 VIEWS  LOCATION: [facility]  DATE: 2/11/2025    INDICATION: cp  COMPARISON: 7/8/2020.    FINDINGS: The heart size is normal. Scarring at the left lung base. The lungs are otherwise clear. There is no pneumothorax or pleural effusion. Postoperative change in the left clavicle.      Impression    IMPRESSION: No acute abnormality.

## 2025-02-11 NOTE — CONSULTS
[facility] Neurology  [place]    Carlos Ivy MRN# [id number]   Age: 59 year old YOB: 1965               Assessment and Plan:      Confusion, history of alcohol use     Concern for Wernicke's encephalopathy, thiamine supplementation has been instituted appropriately.    My clinical exam is quite limited today as he is very sleepy.  I will see him again tomorrow and see if I can get a better cognitive evaluation.    Will check B12 level, otherwise no new work up to recommend today  He likely would not be able to cooperate with an MRI, we can revisit that tomorrow  There is no neck stiffness, no indication for lumbar puncture  I agree with alcohol withdrawal protocol at this point.    I will see him tomorrow.              Chief Complaint/HPI:     This patient is a 59-year-old gentleman seen for neurologic evaluation today.  He was brought to the hospital yesterday from his assisted living facility with report of confusion.  He was noted to be disoriented.  He does have a significant history of alcohol use, and concern for Wernicke's encephalopathy was raised.  He has received high-dose thiamine supplementation already.  Initial head CT is unremarkable.  Labs show modest transaminase elevations.  He does not advocate headache or other complaints, but he is quite sleepy and minimally responsive for me today.  Looks like he was admitted here to the hospital on January 22 of this year with concern for alcohol withdrawal seizure as he had an episode of unresponsiveness.  Subsequently, EEG was unremarkable.            Past Medical History:    has a past medical history of Alcohol abuse, Anxiety, Back pain, chronic, Depression, Hypertension, and Substance abuse (H).          Past Surgical History:    has a past surgical history that includes Tonsillectomy and Extraction(s) dental.          Social History:     Social History     Tobacco Use    Smoking status: Every Day     Current packs/day: 1.00      Average packs/day: 1 pack/day for 20.0 years (20.0 ttl pk-yrs)     Types: Cigarettes    Smokeless tobacco: Never   Substance Use Topics    Alcohol use: Yes     Comment: daily             Family History:   No family history on file.             Allergies:   No Known Allergies          Medications:     Current Facility-Administered Medications:     acetaminophen (TYLENOL) tablet 650 mg, 650 mg, Oral, Q4H PRN **OR** acetaminophen (TYLENOL) Suppository 650 mg, 650 mg, Rectal, Q4H PRN, Francisco Javier Tate MD    benzocaine-menthol (CHLORASEPTIC) 6-10 MG lozenge 1 lozenge, 1 lozenge, Buccal, Q1H PRN, Francisco Javier Tate MD    calcium carbonate (TUMS) chewable tablet 1,000 mg, 1,000 mg, Oral, 4x Daily PRN, Francisco Javier Tate MD    cloNIDine (CATAPRES) tablet 0.1 mg, 0.1 mg, Oral, Q8H, Francisco Javier Tate MD, 0.1 mg at 02/11/25 1042    enoxaparin ANTICOAGULANT (LOVENOX) injection 40 mg, 40 mg, Subcutaneous, Q24H, Francisco Javier Tate MD, 40 mg at 02/11/25 1041    famotidine (PEPCID) injection 20 mg, 20 mg, Intravenous, BID, Francisco Javier Tate MD    flumazenil (ROMAZICON) injection 0.2 mg, 0.2 mg, Intravenous, q1 min prn, Francisco Javier Tate MD    flumazenil (ROMAZICON) injection 0.2 mg, 0.2 mg, Intravenous, q1 min prn, Francisco Javier Tate MD    [START ON 2/12/2025] folic acid (FOLVITE) tablet 1 mg, 1 mg, Oral, Daily, Francisco Javier Tate MD    [START ON 2/18/2025] gabapentin (NEURONTIN) capsule 100 mg, 100 mg, Oral, Q8H, Francisco Javier Tate MD    [START ON 2/16/2025] gabapentin (NEURONTIN) capsule 300 mg, 300 mg, Oral, Q8H, Francisco Javier Tate MD    [START ON 2/14/2025] gabapentin (NEURONTIN) capsule 600 mg, 600 mg, Oral, Q8H, Francisco Javier Tate MD    gabapentin (NEURONTIN) capsule 900 mg, 900 mg, Oral, Q8H, Francisco Javier Tate MD, 900 mg at 02/11/25 1042    guaiFENesin (ROBITUSSIN) 20 mg/mL solution 200 mg, 200 mg, Oral, Q4H PRN, Francisco Javier Tate MD    OLANZapine zydis (zyPREXA) ODT tab 5-10 mg, 5-10 mg, Oral, Q6H PRN **OR** haloperidol lactate (HALDOL) injection 2.5-5 mg,  2.5-5 mg, Intravenous, Q6H PRN, Francisco Javier Tate MD    hydrALAZINE (APRESOLINE) tablet 10 mg, 10 mg, Oral, Q4H PRN **OR** hydrALAZINE (APRESOLINE) injection 10 mg, 10 mg, Intravenous, Q4H PRN, Francisco Javier Tate MD    lidocaine (LMX4) cream, , Topical, Q1H PRN, Francisco Javier Tate MD    lidocaine 1 % 0.1-1 mL, 0.1-1 mL, Other, Q1H PRN, Francisco Javier Tate MD    LORazepam (ATIVAN) tablet 1-2 mg, 1-2 mg, Oral, Q30 Min PRN **OR** LORazepam (ATIVAN) injection 1-2 mg, 1-2 mg, Intravenous, Q30 Min PRN, Francisco Javier Tate MD, 2 mg at 02/11/25 1041    melatonin tablet 5 mg, 5 mg, Oral, At Bedtime PRN, Francisco Javier Tate MD    [START ON 2/12/2025] multivitamin w/minerals (THERA-VIT-M) tablet 1 tablet, 1 tablet, Oral, Daily, Francisco Javier Tate MD    nicotine (NICODERM CQ) 21 MG/24HR 24 hr patch 1 patch, 1 patch, Transdermal, Daily, Franck Bearden MD    nicotine (NICORETTE) gum 2 mg, 2 mg, Buccal, Q1H PRN, Francisco Javier Tate MD, 2 mg at 02/11/25 0327    ondansetron (ZOFRAN ODT) ODT tab 4 mg, 4 mg, Oral, Q6H PRN **OR** ondansetron (ZOFRAN) injection 4 mg, 4 mg, Intravenous, Q6H PRN, Francisco Javier Tate MD    pantoprazole (PROTONIX) IV push injection 40 mg, 40 mg, Intravenous, Daily with breakfast, Francisco Javier Tate MD    senna-docusate (SENOKOT-S/PERICOLACE) 8.6-50 MG per tablet 1 tablet, 1 tablet, Oral, BID PRN **OR** senna-docusate (SENOKOT-S/PERICOLACE) 8.6-50 MG per tablet 2 tablet, 2 tablet, Oral, BID PRN, Francisco Javier Tate MD    sodium chloride (PF) 0.9% PF flush 3 mL, 3 mL, Intracatheter, Q8H, Francisco Javier Tate MD    sodium chloride (PF) 0.9% PF flush 3 mL, 3 mL, Intracatheter, q1 min prn, Francisco Javier Tate MD, 3 mL at 02/11/25 0328    tamsulosin (FLOMAX) capsule 0.4 mg, 0.4 mg, Oral, QPM, Francisco Javier Tate MD    thiamine (B-1) injection 500 mg, 500 mg, Intravenous, TID **FOLLOWED BY** [START ON 2/13/2025] thiamine (B-1) injection 250 mg, 250 mg, Intravenous, Daily **FOLLOWED BY** [START ON 2/18/2025] thiamine (B-1) tablet 100 mg, 100 mg, Oral, Daily,  Francisco Javier Tate MD    Current Outpatient Medications:     acetaminophen (TYLENOL) 500 MG tablet, Take 1 tablet (500 mg) by mouth every 6 hours as needed for mild pain., Disp: , Rfl:     folic acid (FOLVITE) 1 MG tablet, Take 1 tablet (1 mg) by mouth daily., Disp: , Rfl:     ketoconazole (NIZORAL) 2 % external shampoo, Apply topically every evening. To beard x2 weeks for seborrheic dermatitis, Disp: , Rfl:     multivitamin w/minerals (THERA-VIT-M) tablet, Take 1 tablet by mouth daily., Disp: , Rfl:     tamsulosin (FLOMAX) 0.4 MG capsule, Take 1 capsule (0.4 mg) by mouth every evening., Disp: , Rfl:     thiamine (B-1) 100 MG tablet, Take 1 tablet (100 mg) by mouth daily., Disp: , Rfl:               Physical Exam:      Vitals: /66   Pulse 64   Temp 98.3  F (36.8  C) (Oral)   Resp 15   Ht 1.829 m (6')   Wt 86.2 kg (190 lb)   SpO2 99%   BMI 25.77 kg/m    BMI= Body mass index is 25.77 kg/m .       Patient is still very sleepy , supine in the ER, no JV distention noted  Neurological Exam:   Mental status: Patient is supine in the emergency room  Breathing comfortably on his own,  Minimal response to voice, he could not name the hospital  He did pronounces last name for me  He did not respond to any other questions or commands  Does withdraw to painful stimulus on both sides  He resists me opening his eyes for pupil exam   CN II: PERRLA.   CN III, IV, VI: Eyes are midposition, conjugate, no nystagmus seen   CN VII: Face is symmetric   CN VIII: Unable to test   CN IX, X: Unable to test   Motor: Unable to formally test strength due to unresponsiveness  Tone is normal in the extremities   Reflexes: Reflexes are    Sensory: Unable to test absent throughout   Coordination: Unable to test due to unresponsiveness, no tremors or other abnormal involuntary movements   Gait: Unable to test due to unresponsiveness     Miguel Angel Dolan MD       More than 45 minutes spent reviewing records and results, evaluating patient,  discussing with pt and family and on documentation

## 2025-02-12 ENCOUNTER — APPOINTMENT (OUTPATIENT)
Dept: MRI IMAGING | Facility: HOSPITAL | Age: 60
DRG: 897 | End: 2025-02-12
Attending: PSYCHIATRY & NEUROLOGY
Payer: COMMERCIAL

## 2025-02-12 ENCOUNTER — APPOINTMENT (OUTPATIENT)
Dept: CARDIOLOGY | Facility: HOSPITAL | Age: 60
DRG: 897 | End: 2025-02-12
Attending: HOSPITALIST
Payer: COMMERCIAL

## 2025-02-12 LAB
HOLD SPECIMEN: NORMAL
MAGNESIUM SERPL-MCNC: 2.1 MG/DL (ref 1.7–2.3)
POTASSIUM SERPL-SCNC: 3.9 MMOL/L (ref 3.4–5.3)

## 2025-02-12 PROCEDURE — 99232 SBSQ HOSP IP/OBS MODERATE 35: CPT | Performed by: PSYCHIATRY & NEUROLOGY

## 2025-02-12 PROCEDURE — 250N000013 HC RX MED GY IP 250 OP 250 PS 637: Performed by: HOSPITALIST

## 2025-02-12 PROCEDURE — 70551 MRI BRAIN STEM W/O DYE: CPT

## 2025-02-12 PROCEDURE — 96372 THER/PROPH/DIAG INJ SC/IM: CPT | Performed by: HOSPITALIST

## 2025-02-12 PROCEDURE — 93306 TTE W/DOPPLER COMPLETE: CPT

## 2025-02-12 PROCEDURE — 36415 COLL VENOUS BLD VENIPUNCTURE: CPT | Performed by: INTERNAL MEDICINE

## 2025-02-12 PROCEDURE — 93306 TTE W/DOPPLER COMPLETE: CPT | Mod: 26 | Performed by: INTERNAL MEDICINE

## 2025-02-12 PROCEDURE — 250N000013 HC RX MED GY IP 250 OP 250 PS 637: Performed by: EMERGENCY MEDICINE

## 2025-02-12 PROCEDURE — 258N000003 HC RX IP 258 OP 636

## 2025-02-12 PROCEDURE — 83735 ASSAY OF MAGNESIUM: CPT | Performed by: INTERNAL MEDICINE

## 2025-02-12 PROCEDURE — 120N000001 HC R&B MED SURG/OB

## 2025-02-12 PROCEDURE — 84132 ASSAY OF SERUM POTASSIUM: CPT | Performed by: INTERNAL MEDICINE

## 2025-02-12 PROCEDURE — 250N000011 HC RX IP 250 OP 636: Performed by: HOSPITALIST

## 2025-02-12 PROCEDURE — 99232 SBSQ HOSP IP/OBS MODERATE 35: CPT | Performed by: INTERNAL MEDICINE

## 2025-02-12 PROCEDURE — 250N000009 HC RX 250: Performed by: HOSPITALIST

## 2025-02-12 RX ORDER — DIPHENHYDRAMINE HCL 25 MG
25 CAPSULE ORAL EVERY 6 HOURS PRN
Status: DISCONTINUED | OUTPATIENT
Start: 2025-02-12 | End: 2025-02-13 | Stop reason: HOSPADM

## 2025-02-12 RX ADMIN — PANTOPRAZOLE SODIUM 40 MG: 40 INJECTION, POWDER, FOR SOLUTION INTRAVENOUS at 09:47

## 2025-02-12 RX ADMIN — THIAMINE HYDROCHLORIDE 500 MG: 100 INJECTION, SOLUTION INTRAMUSCULAR; INTRAVENOUS at 14:54

## 2025-02-12 RX ADMIN — SODIUM CHLORIDE 500 ML: 0.9 INJECTION, SOLUTION INTRAVENOUS at 04:23

## 2025-02-12 RX ADMIN — NICOTINE 1 PATCH: 21 PATCH, EXTENDED RELEASE TRANSDERMAL at 09:49

## 2025-02-12 RX ADMIN — FOLIC ACID 1 MG: 1 TABLET ORAL at 09:47

## 2025-02-12 RX ADMIN — LORAZEPAM 1 MG: 0.5 TABLET ORAL at 10:13

## 2025-02-12 RX ADMIN — GABAPENTIN 900 MG: 300 CAPSULE ORAL at 02:42

## 2025-02-12 RX ADMIN — LORAZEPAM 1 MG: 0.5 TABLET ORAL at 20:03

## 2025-02-12 RX ADMIN — LORAZEPAM 1 MG: 0.5 TABLET ORAL at 02:43

## 2025-02-12 RX ADMIN — GABAPENTIN 900 MG: 300 CAPSULE ORAL at 19:47

## 2025-02-12 RX ADMIN — THIAMINE HYDROCHLORIDE 500 MG: 100 INJECTION, SOLUTION INTRAMUSCULAR; INTRAVENOUS at 19:46

## 2025-02-12 RX ADMIN — TAMSULOSIN HYDROCHLORIDE 0.4 MG: 0.4 CAPSULE ORAL at 19:47

## 2025-02-12 RX ADMIN — THIAMINE HYDROCHLORIDE 500 MG: 100 INJECTION, SOLUTION INTRAMUSCULAR; INTRAVENOUS at 09:46

## 2025-02-12 RX ADMIN — Medication 1 TABLET: at 09:46

## 2025-02-12 RX ADMIN — ENOXAPARIN SODIUM 40 MG: 40 INJECTION SUBCUTANEOUS at 09:47

## 2025-02-12 RX ADMIN — GABAPENTIN 900 MG: 300 CAPSULE ORAL at 12:04

## 2025-02-12 RX ADMIN — CLONIDINE HYDROCHLORIDE 0.1 MG: 0.1 TABLET ORAL at 02:43

## 2025-02-12 RX ADMIN — FAMOTIDINE 20 MG: 10 INJECTION, SOLUTION INTRAVENOUS at 09:46

## 2025-02-12 ASSESSMENT — ACTIVITIES OF DAILY LIVING (ADL)
ADLS_ACUITY_SCORE: 57
ADLS_ACUITY_SCORE: 55
ADLS_ACUITY_SCORE: 55
ADLS_ACUITY_SCORE: 57
ADLS_ACUITY_SCORE: 55
ADLS_ACUITY_SCORE: 57
ADLS_ACUITY_SCORE: 55
DEPENDENT_IADLS:: COOKING;CLEANING;SHOPPING;TRANSPORTATION
ADLS_ACUITY_SCORE: 55
ADLS_ACUITY_SCORE: 57
ADLS_ACUITY_SCORE: 55
ADLS_ACUITY_SCORE: 55

## 2025-02-12 NOTE — CARE PLAN
"PRIMARY DIAGNOSIS: \"GENERIC\" NURSING  OUTPATIENT/OBSERVATION GOALS TO BE MET BEFORE DISCHARGE:  ADLs back to baseline: No    Activity and level of assistance: Up with maximum assistance. Consider SW and/or PT evaluation.     Pain status: Pain free.    Return to near baseline physical activity: No     Discharge Planner Nurse   Safe discharge environment identified: No  Barriers to discharge: Yes       Entered by: Zelda Knapp RN 02/12/2025 5:06 AM     Please review provider order for any additional goals.   Nurse to notify provider when observation goals have been met and patient is ready for discharge.  "

## 2025-02-12 NOTE — PROGRESS NOTES
Patient was seen in the ED. He has been lethargy. Per nursing staff,  he was very agitated when awake.   Continue Valium per CIWA.   Rest per initial H&P.    [facility] Medicine Service  Kd Leos MD

## 2025-02-12 NOTE — PROGRESS NOTES
[facility]    Medicine Progress Note - Hospitalist Service    Date of Admission:  2/10/2025    Assessment & Plan     Carlos Ivy is a 59 year old male with history of hypertension, alcohol use disorder, anxiety, and depression, who presented from TCU to ER for evaluation of confusion.     Altered mental status:  Presents as confusion.  CT head shows no acute intracranial abnormalities.  Vitamin b12, ammonia are normal  Currently, concerning Wernicke's encephalopathy given his history of alcohol abuse.  - Neurology consulted: recommends MRI brain  - Continue high dose thiamine    Alcohol abuse with alcohol withdrawal:  Reports having 12 packs of beer daily.   - Continue valium, gabapentin per CIWA  - Discontinue clonidine due to hypotension  - Continue folic acid and thiamine.     Acute anemia:   Hemoglobin was normal at 13 two weeks ago. It was 10.4 on admission, trended down to 9.5 today.   He reports no melena or hematochezia. He did received multiple fluid bolus since admission.   - Continue to monitor hemoglobin    Skin rash: improved and no longer itchy.     BPH: continue PTA flomax.        Diet: Combination Diet Regular Diet Adult    DVT Prophylaxis: Enoxaparin (Lovenox) SQ  Quiles Catheter: Not present  Lines: None     Cardiac Monitoring: ACTIVE order. Indication: QTc prolonging medication (48 hours)  Code Status: Full Code      Clinically Significant Risk Factors Present on Admission          # Hyperchloremia: Highest Cl = 112 mmol/L in last 2 days, will monitor as appropriate       # Hypercalcemia: corrected calcium is >10.1, will monitor as appropriate    # Hypoalbuminemia: Lowest albumin = 3.1 g/dL at 2/11/2025  6:20 AM, will monitor as appropriate     # Hypertension: Noted on problem list      # Anemia: based on hgb <11       # Overweight: Estimated body mass index is 25.77 kg/m  as calculated from the following:    Height as of this encounter: 1.829 m (6').    Weight as of this  encounter: 86.2 kg (190 lb).       # Financial/Environmental Concerns:    # Transportation: concerns for reliable transportation noted in nursing assessment         Social Drivers of Health    Food Insecurity: Unknown (2/12/2025)    Food Insecurity     Within the past 12 months, did you worry that your food would run out before you got money to buy more?: Patient unable to answer     Within the past 12 months, did the food you bought just not last and you didn t have money to get more?: Patient unable to answer   Depression: At risk (11/1/2022)    [facility] [employer] [facility]    PHQ-2     PHQ-2 TOTAL SCORE: 4   Housing Stability: High Risk (2/12/2025)    Housing Stability     Do you have housing? : Yes     Are you worried about losing your housing?: Yes   Tobacco Use: High Risk (1/23/2025)    Patient History     Smoking Tobacco Use: Every Day     Smokeless Tobacco Use: Never   Financial Resource Strain: Unknown (2/12/2025)    Financial Resource Strain     Within the past 12 months, have you or your family members you live with been unable to get utilities (heat, electricity) when it was really needed?: Patient unable to answer   Transportation Needs: Unknown (2/12/2025)    Transportation Needs     Within the past 12 months, has lack of transportation kept you from medical appointments, getting your medicines, non-medical meetings or appointments, work, or from getting things that you need?: Patient unable to answer    [facility]    Social Connections          Disposition Plan     Medically Ready for Discharge: Anticipated in 2-4 Days             Kd Leos MD  Hospitalist Service  [facility]  Securely message with [employer] (more info)  Text page via [employer] P[employer]   ______________________________________________________________________    Interval History   Patient is awake and alert today. He reports that  "he drinks 12 packs of beer daily. He does not remember the last time he drank. He knows he is in the hospital. But he does not know the reason he got sent here. He states \"I am confused\". He reports no chest pain, abdominal pain. He tolerates diet well. His skin is no longer itchy.     Physical Exam   Vital Signs: Temp: 97.8  F (36.6  C) Temp src: Oral BP: 133/63 Pulse: 67   Resp: 16 SpO2: 95 % O2 Device: None (Room air)    Weight: 190 lbs 0 oz    General appearance: not in acute distress  HEENT: PERRL, EOMI  Lungs: Clear breath sounds in bilateral lung fields  Cardiovascular: Regular rate and rhythm, normal S1-S2  Abdomen: Soft, non tender, no distension  Musculoskeletal: No joint swelling  Skin: No rash and no edema  Neurology: AAO ×3.  Cranial nerves II - XII normal.  Normal muscle strength in all four extremities.     Medical Decision Making       48 MINUTES SPENT BY ME on the date of service doing chart review, history, exam, documentation & further activities per the note.      Data     I have personally reviewed the following data over the past 24 hrs:    N/A  \   N/A   / N/A     N/A N/A N/A /  N/A   3.9 N/A N/A \       Imaging results reviewed over the past 24 hrs:   No results found for this or any previous visit (from the past 24 hours).  "

## 2025-02-12 NOTE — CONSULTS
Care Management Initial Consult    General Information  Assessment completed with: Family, Nita Ivy - Mother  Type of CM/SW Visit: Initial Assessment    Primary Care Provider verified and updated as needed: Yes   Readmission within the last 30 days: current reason for admission unrelated to previous admission      Reason for Consult: discharge planning  Advance Care Planning:            Communication Assessment  Patient's communication style: spoken language (English or Bilingual)    Hearing Difficulty or Deaf: no   Wear Glasses or Blind: no    Cognitive  Cognitive/Neuro/Behavioral: .WDL except  Level of Consciousness: confused  Arousal Level: arouses to voice  Orientation: person  Mood/Behavior: calm, cooperative  Best Language: 0 - No aphasia  Speech: clear    Living Environment:   People in home: parent(s)  Nita/mother  Current living Arrangements: house      Able to return to prior arrangements: yes       Family/Social Support:  Care provided by: parent(s)  Provides care for:    Marital Status: Single  Support system: Parent(s), Sibling(s)          Description of Support System: Involved         Current Resources:   Patient receiving home care services: No        Community Resources:    Equipment currently used at home: walker, standard  Supplies currently used at home:      Employment/Financial:  Employment Status: unemployed        Financial Concerns:             Does the patient's insurance plan have a 3 day qualifying hospital stay waiver?  Yes     Which insurance plan 3 day waiver is available? Alternative insurance waiver    Will the waiver be used for post-acute placement? Yes    Lifestyle & Psychosocial Needs:  Social Drivers of Health     Food Insecurity: Unknown (2/12/2025)    Food Insecurity     Within the past 12 months, did you worry that your food would run out before you got money to buy more?: Patient unable to answer     Within the past 12 months, did the food you bought just not last  and you didn t have money to get more?: Patient unable to answer   Depression: At risk (11/1/2022)    [facility]    PHQ-2     PHQ-2 TOTAL SCORE: 4   Housing Stability: High Risk (2/12/2025)    Housing Stability     Do you have housing? : Yes     Are you worried about losing your housing?: Yes   Tobacco Use: High Risk (1/23/2025)    Patient History     Smoking Tobacco Use: Every Day     Smokeless Tobacco Use: Never     Passive Exposure: Not on file   Financial Resource Strain: Unknown (2/12/2025)    Financial Resource Strain     Within the past 12 months, have you or your family members you live with been unable to get utilities (heat, electricity) when it was really needed?: Patient unable to answer   Alcohol Use: Not on file   Transportation Needs: Unknown (2/12/2025)    Transportation Needs     Within the past 12 months, has lack of transportation kept you from medical appointments, getting your medicines, non-medical meetings or appointments, work, or from getting things that you need?: Patient unable to answer   Physical Activity: Not on file   Interpersonal Safety: Low Risk  (1/23/2025)    Interpersonal Safety     Do you feel physically and emotionally safe where you currently live?: Yes     Within the past 12 months, have you been hit, slapped, kicked or otherwise physically hurt by someone?: No     Within the past 12 months, have you been humiliated or emotionally abused in other ways by your partner or ex-partner?: No   Stress: Not on file   Social Connections: Unknown (11/2/2023)    Received from [employer] [facility]    Social Connections     Frequency of Communication with Friends and Family: Not on file   Health Literacy: Not on file       Functional Status:  Prior to admission patient needed assistance:   Dependent ADLs:: Ambulation-walker  Dependent IADLs:: Cooking, Cleaning, Shopping, Transportation       Mental Health  Status:  Mental Health Status: No Current Concerns       Chemical Dependency Status:  Chemical Dependency Status: Current Concern  Chemical Dependency Management: Previous treatment          Values/Beliefs:  Spiritual, Cultural Beliefs, Pent[employer]alism Practices, Values that affect care: no               Discussed  Partnership in Safe Discharge Planning  document with patient/family: No    Additional Information: The chart was reviewed, and the SW met with the patient and his mother, Nita, at the bedside for the initial assessment. The patient was confused, and his mother provided the history. The patient was recently admitted on 1/22/25 for alcohol withdrawal seizures and was discharged to TCU [facility]. He has a bed hold at the TCU, and the plan is for him to return when medically ready for discharge to continue with rehab.    Per his mother's report, the patient has had issues with alcohol use for several years and has a history of chemical dependency treatment. She notes that treatments have not been successful for him as he would relapse shortly after. She feels that the patient has been having memory issues and confusion for some time and believes this is related to substance abuse. She notes that he is not employed and does not have any income. He lives with her and has a sedentary lifestyle, staying mainly in his room. His mother works part-time and helps him with daily activities when she is at home. The patient does not drive. Nita mentioned that she has a daughter who lives in the area and can provide some support.    The patient does not have a HCD, and SW discussed this with his mother, offering a copy if the patient would like to fill it out when his confusion clears. She declined the copy of the HCD for review. We discussed chemical dependency treatment, and she does not feel this would be beneficial to the patient due to his prior treatment history. She mentioned that at this time,  she would not be able to state that treatment would be a direction to consider. She notes she is unsure how the patient got access to alcohol at TCU. She feels that his test result for alcohol here could be false and suggested retesting.    SW notified her that we plan for the patient to return to TCU to continue with rehab, and if the discharge plan changes, we will keep her updated. The patient required medical transportation at discharge, and she agreed to a wheelchair transport. She was notified that if his health insurance does not cover transportation, it will be private pay. She verbalized agreement with arranging a ride.      Next Steps: JUAN will continue to follow and assist with discharge planning.     TRINITY Schwartz

## 2025-02-12 NOTE — CARE PLAN
"PRIMARY DIAGNOSIS: \"GENERIC\" NURSING  OUTPATIENT/OBSERVATION GOALS TO BE MET BEFORE DISCHARGE:  ADLs back to baseline: No    Activity and level of assistance: Up with maximum assistance. Consider SW and/or PT evaluation.     Pain status: Pain free.    Return to near baseline physical activity: No     Discharge Planner Nurse   Safe discharge environment identified: No  Barriers to discharge: Yes       Entered by: Zelda Knapp RN 02/12/2025 5:05 AM     Please review provider order for any additional goals.   Nurse to notify provider when observation goals have been met and patient is ready for discharge.  "

## 2025-02-12 NOTE — SIGNIFICANT EVENT
Significant Event Note    Time of event: 4:14 AM February 12, 2025    Description of event:  Notified by nursing that BP soft at 82/51. Just drowsy but not new for him. Did just get clonidine about an hour ago.   Here for alcohol withdrawal.     Will give 500 NSB. Monitor closely.       Taye Hill MD

## 2025-02-12 NOTE — PLAN OF CARE
Goal Outcome Evaluation:   PRIMARY DIAGNOSIS: GENERALIZED WEAKNESS    OUTPATIENT/OBSERVATION GOALS TO BE MET BEFORE DISCHARGE  1. Ohostatic performed: N/A    2. Tolerating PO medications: Yes    3. Return to near baseline physical activity:  Unable to assess. Has been in bed all shift    4. Cleared for discharge by consultants (if involved): No    Discharge Planner Nurse   Safe discharge environment identified: Yes  Barriers to discharge: Yes: CWIA       Entered by: Wiley Wynne RN 02/12/2025 12:22 PM

## 2025-02-12 NOTE — PLAN OF CARE
Goal Outcome Evaluation:    Intermittently lethargic, but arousal with voice and touch. CWIA was 8. Gave 1 mg of lorazepam. Repeated was 6 and 3. Disoriented with situation and time. Vs is stable. No void since last straight cath at 07:00. Bladder scan is 159 at 1400. No c/o pressure in bladder. No c/o pain.

## 2025-02-12 NOTE — PLAN OF CARE
Problem: Comorbidity Management  Goal: Maintenance of Behavioral Health Symptom Control  Outcome: Progressing     Problem: Alcohol Withdrawal  Goal: Alcohol Withdrawal Symptom Control  Outcome: Progressing   Goal Outcome Evaluation:  Carlos admitted from ED  to P1 at 2300. Oriented to self. Transferred to bed via slideboard. Sinus ayla on telemetry. Room air. Did not ambulate this shift. Drowsy but arousable. Oriented to self only. He did eat a sandwich and drink some water around 0300.    CIWA ranging from 6 to 10 overnight. Ativan given x2. Visible tremors and pt is slightly anxious. He's cooperative for the most part but at times not wanting to answer certain assessment questions. He was incontinent of urine and unsure of the last time he voided so we bladder scanned him for 439ml and 509mls. Voiding protocol is ordered. Attempted to use the urinal multiple times but he was unable to void. Straight cath performed at 0700 for 600mls.    Blood pressure around 0330 was a little soft 82/51. Dr. Hill ordered a 500ml bolus and recheck was 95/51.

## 2025-02-12 NOTE — PROGRESS NOTES
[facility] Neurology  [place]    Carlos Ivy MRN# [id number]   Age: 59 year old YOB: 1965               Assessment and Plan:      Confusion, history of alcohol use     Concern for Wernicke's encephalopathy, thiamine supplementation has been instituted appropriately.    CT head showed no acute issues  He does show significant deficits in short term memory and orientation.   Asterixis suggests EtOH withdrawal     Will try to get MRI, I think he can cooperate              Chief Complaint/HPI:     2/12/25: Pt more awake today, no particular complaints, he does not remember me from yesterday    2/11/25:  This patient is a 59-year-old gentleman seen for neurologic evaluation today.  He was brought to the hospital yesterday from his assisted living facility with report of confusion.  He was noted to be disoriented.  He does have a significant history of alcohol use, and concern for Wernicke's encephalopathy was raised.  He has received high-dose thiamine supplementation already.  Initial head CT is unremarkable.  Labs show modest transaminase elevations.  He does not advocate headache or other complaints, but he is quite sleepy and minimally responsive for me today.  Looks like he was admitted here to the hospital on January 22 of this year with concern for alcohol withdrawal seizure as he had an episode of unresponsiveness.  Subsequently, EEG was unremarkable.            Past Medical History:    has a past medical history of Alcohol abuse, Anxiety, Back pain, chronic, Depression, Hypertension, and Substance abuse (H).          Past Surgical History:    has a past surgical history that includes Tonsillectomy and Extraction(s) dental.          Social History:     Social History     Tobacco Use    Smoking status: Every Day     Current packs/day: 1.00     Average packs/day: 1 pack/day for 20.0 years (20.0 ttl pk-yrs)     Types: Cigarettes    Smokeless tobacco: Never   Substance Use Topics    Alcohol  use: Yes     Comment: daily             Family History:   No family history on file.             Allergies:   No Known Allergies          Medications:     Current Facility-Administered Medications:     acetaminophen (TYLENOL) tablet 650 mg, 650 mg, Oral, Q4H PRN **OR** acetaminophen (TYLENOL) Suppository 650 mg, 650 mg, Rectal, Q4H PRN, Francisco Javier Tate MD    benzocaine-menthol (CHLORASEPTIC) 6-10 MG lozenge 1 lozenge, 1 lozenge, Buccal, Q1H PRN, Francisco Javier Tate MD    calcium carbonate (TUMS) chewable tablet 1,000 mg, 1,000 mg, Oral, 4x Daily PRN, Francisco Javier Tate MD    enoxaparin ANTICOAGULANT (LOVENOX) injection 40 mg, 40 mg, Subcutaneous, Q24H, Francisco Javier Tate MD, 40 mg at 02/12/25 0947    famotidine (PEPCID) injection 20 mg, 20 mg, Intravenous, BID, Francisco Javier Tate MD, 20 mg at 02/12/25 0946    flumazenil (ROMAZICON) injection 0.2 mg, 0.2 mg, Intravenous, q1 min prn, Francisco Javier Tate MD    flumazenil (ROMAZICON) injection 0.2 mg, 0.2 mg, Intravenous, q1 min prn, Francisco Javier Tate MD    folic acid (FOLVITE) tablet 1 mg, 1 mg, Oral, Daily, Francisco Javier Tate MD, 1 mg at 02/12/25 0947    [START ON 2/18/2025] gabapentin (NEURONTIN) capsule 100 mg, 100 mg, Oral, Q8H, Francisco Javier Tate MD    [START ON 2/16/2025] gabapentin (NEURONTIN) capsule 300 mg, 300 mg, Oral, Q8H, Francisco Javier Tate MD    [START ON 2/14/2025] gabapentin (NEURONTIN) capsule 600 mg, 600 mg, Oral, Q8H, Francisco Javier Tate MD    gabapentin (NEURONTIN) capsule 900 mg, 900 mg, Oral, Q8H, Francisco Javier Tate MD, 900 mg at 02/12/25 1204    guaiFENesin (ROBITUSSIN) 20 mg/mL solution 200 mg, 200 mg, Oral, Q4H PRN, Francisco Javier Tate MD    OLANZapine zydis (zyPREXA) ODT tab 5-10 mg, 5-10 mg, Oral, Q6H PRN **OR** haloperidol lactate (HALDOL) injection 2.5-5 mg, 2.5-5 mg, Intravenous, Q6H PRN, Francisco Javier Tate MD    hydrALAZINE (APRESOLINE) tablet 10 mg, 10 mg, Oral, Q4H PRN **OR** hydrALAZINE (APRESOLINE) injection 10 mg, 10 mg, Intravenous, Q4H PRN, Francisco Javier Tate MD    lidocaine  (LMX4) cream, , Topical, Q1H PRN, Francisco Javier Tate MD    lidocaine 1 % 0.1-1 mL, 0.1-1 mL, Other, Q1H PRN, Francisco Javier Tate MD    LORazepam (ATIVAN) tablet 1-2 mg, 1-2 mg, Oral, Q30 Min PRN, 1 mg at 02/12/25 1013 **OR** LORazepam (ATIVAN) injection 1-2 mg, 1-2 mg, Intravenous, Q30 Min PRN, Francisco Javier Tate MD, 2 mg at 02/11/25 1041    melatonin tablet 5 mg, 5 mg, Oral, At Bedtime PRN, Francisco Javier Tate MD    multivitamin w/minerals (THERA-VIT-M) tablet 1 tablet, 1 tablet, Oral, Daily, Francisco Javier Tate MD, 1 tablet at 02/12/25 0946    nicotine (NICODERM CQ) 21 MG/24HR 24 hr patch 1 patch, 1 patch, Transdermal, Daily, Franck Bearden MD, 1 patch at 02/12/25 0949    nicotine (NICORETTE) gum 2 mg, 2 mg, Buccal, Q1H PRN, Francisco Javier Tate MD, 2 mg at 02/11/25 0327    ondansetron (ZOFRAN ODT) ODT tab 4 mg, 4 mg, Oral, Q6H PRN **OR** ondansetron (ZOFRAN) injection 4 mg, 4 mg, Intravenous, Q6H PRN, Francisco Javier Tate MD    pantoprazole (PROTONIX) IV push injection 40 mg, 40 mg, Intravenous, Daily with breakfast, Francisco Javier Tate MD, 40 mg at 02/12/25 0947    senna-docusate (SENOKOT-S/PERICOLACE) 8.6-50 MG per tablet 1 tablet, 1 tablet, Oral, BID PRN **OR** senna-docusate (SENOKOT-S/PERICOLACE) 8.6-50 MG per tablet 2 tablet, 2 tablet, Oral, BID PRN, Francisco Javier Tate MD    sodium chloride (PF) 0.9% PF flush 3 mL, 3 mL, Intracatheter, Q8H, Francisco Javier Tate MD, 3 mL at 02/12/25 1205    sodium chloride (PF) 0.9% PF flush 3 mL, 3 mL, Intracatheter, q1 min prn, Francisco Javier Tate MD, 3 mL at 02/11/25 0328    tamsulosin (FLOMAX) capsule 0.4 mg, 0.4 mg, Oral, QPM, Francisco Javier Tate MD, 0.4 mg at 02/11/25 1943    thiamine (B-1) injection 500 mg, 500 mg, Intravenous, TID, 500 mg at 02/12/25 0946 **FOLLOWED BY** [START ON 2/13/2025] thiamine (B-1) injection 250 mg, 250 mg, Intravenous, Daily **FOLLOWED BY** [START ON 2/18/2025] thiamine (B-1) tablet 100 mg, 100 mg, Oral, Daily, Francisco Javier Tate MD              Physical Exam:      Vitals: BP 93/63 (BP  Location: Right arm)   Pulse 84   Temp 98.3  F (36.8  C) (Oral)   Resp 18   Ht 1.829 m (6')   Wt 86.2 kg (190 lb)   SpO2 99%   BMI 25.77 kg/m    BMI= Body mass index is 25.77 kg/m .       Patient is still very sleepy , supine in the ER, no JV distention noted  Neurological Exam:   Mental status: much more awake today  Eyes open, able to converse normally  Initially guesses [facility]  Cannot say what floor or what month or the president  After a couple minutes he is able to remember [employer] but not the month   CN II: PERRLA.   CN III, IV, VI: Eyes are midposition, conjugate, no nystagmus seen   CN VII: Face is symmetric   Motor: no focal or lateralized weakness in extremities   Sensory: Unable to test absent throughout   Coordination: no ataxia or dysmetria, moderate postural tremor and asterixis     Miguel Angel Dolan MD       More than 35 minutes spent reviewing records and results, evaluating patient, discussing with pt and on documentation

## 2025-02-13 VITALS
OXYGEN SATURATION: 99 % | TEMPERATURE: 98 F | DIASTOLIC BLOOD PRESSURE: 83 MMHG | HEART RATE: 98 BPM | WEIGHT: 190 LBS | HEIGHT: 72 IN | RESPIRATION RATE: 18 BRPM | SYSTOLIC BLOOD PRESSURE: 131 MMHG | BODY MASS INDEX: 25.73 KG/M2

## 2025-02-13 LAB
CREAT SERPL-MCNC: 0.65 MG/DL (ref 0.67–1.17)
EGFRCR SERPLBLD CKD-EPI 2021: >90 ML/MIN/1.73M2
ERYTHROCYTE [DISTWIDTH] IN BLOOD BY AUTOMATED COUNT: 15.3 % (ref 10–15)
HCT VFR BLD AUTO: 31.7 % (ref 40–53)
HGB BLD-MCNC: 10.4 G/DL (ref 13.3–17.7)
MCH RBC QN AUTO: 31.2 PG (ref 26.5–33)
MCHC RBC AUTO-ENTMCNC: 32.8 G/DL (ref 31.5–36.5)
MCV RBC AUTO: 95 FL (ref 78–100)
PLATELET # BLD AUTO: 163 10E3/UL (ref 150–450)
RBC # BLD AUTO: 3.33 10E6/UL (ref 4.4–5.9)
WBC # BLD AUTO: 6.1 10E3/UL (ref 4–11)

## 2025-02-13 PROCEDURE — 250N000011 HC RX IP 250 OP 636: Performed by: HOSPITALIST

## 2025-02-13 PROCEDURE — 36415 COLL VENOUS BLD VENIPUNCTURE: CPT | Performed by: INTERNAL MEDICINE

## 2025-02-13 PROCEDURE — 250N000013 HC RX MED GY IP 250 OP 250 PS 637: Performed by: EMERGENCY MEDICINE

## 2025-02-13 PROCEDURE — 250N000013 HC RX MED GY IP 250 OP 250 PS 637: Performed by: HOSPITALIST

## 2025-02-13 PROCEDURE — 85049 AUTOMATED PLATELET COUNT: CPT | Performed by: INTERNAL MEDICINE

## 2025-02-13 PROCEDURE — 99239 HOSP IP/OBS DSCHRG MGMT >30: CPT | Performed by: INTERNAL MEDICINE

## 2025-02-13 PROCEDURE — 99232 SBSQ HOSP IP/OBS MODERATE 35: CPT | Performed by: PSYCHIATRY & NEUROLOGY

## 2025-02-13 RX ORDER — GABAPENTIN 100 MG/1
CAPSULE ORAL
DISCHARGE
Start: 2025-02-18 | End: 2025-02-24

## 2025-02-13 RX ORDER — LANOLIN ALCOHOL/MO/W.PET/CERES
CREAM (GRAM) TOPICAL
DISCHARGE
Start: 2025-02-14 | End: 2025-05-19

## 2025-02-13 RX ADMIN — Medication 1 TABLET: at 08:46

## 2025-02-13 RX ADMIN — GABAPENTIN 900 MG: 300 CAPSULE ORAL at 03:44

## 2025-02-13 RX ADMIN — THIAMINE HYDROCHLORIDE 250 MG: 100 INJECTION, SOLUTION INTRAMUSCULAR; INTRAVENOUS at 08:46

## 2025-02-13 RX ADMIN — ENOXAPARIN SODIUM 40 MG: 40 INJECTION SUBCUTANEOUS at 08:46

## 2025-02-13 RX ADMIN — GABAPENTIN 900 MG: 300 CAPSULE ORAL at 13:29

## 2025-02-13 RX ADMIN — FOLIC ACID 1 MG: 1 TABLET ORAL at 08:48

## 2025-02-13 RX ADMIN — NICOTINE 1 PATCH: 21 PATCH, EXTENDED RELEASE TRANSDERMAL at 08:53

## 2025-02-13 ASSESSMENT — ACTIVITIES OF DAILY LIVING (ADL)
ADLS_ACUITY_SCORE: 55
ADLS_ACUITY_SCORE: 56
ADLS_ACUITY_SCORE: 55
ADLS_ACUITY_SCORE: 56
ADLS_ACUITY_SCORE: 55
ADLS_ACUITY_SCORE: 56
ADLS_ACUITY_SCORE: 55

## 2025-02-13 NOTE — PLAN OF CARE
Problem: Adult Inpatient Plan of Care  Goal: Absence of Hospital-Acquired Illness or Injury  Intervention: Identify and Manage Fall Risk  Recent Flowsheet Documentation  Taken 2/12/2025 2000 by Jaden Jensen RN  Safety Promotion/Fall Prevention:   activity supervised   lighting adjusted   nonskid shoes/slippers when out of bed   clutter free environment maintained  Intervention: Prevent Infection  Recent Flowsheet Documentation  Taken 2/12/2025 2000 by Jaden Jensen RN  Infection Prevention: hand hygiene promoted     Problem: Comorbidity Management  Goal: Maintenance of Behavioral Health Symptom Control  Outcome: Progressing   Goal Outcome Evaluation: 2019-3301:    Intermittently lethargic. Pt is intermittently disoriented to situation and time. Pt denies pain. Pt scored a 10 on the CIWA, pt was given 1mg Ativan, rechecked an hour later and pt scored a 7. Pt is able to make needs known. IV left SL.

## 2025-02-13 NOTE — PROGRESS NOTES
Care Management Follow Up    Length of Stay (days): 2    Expected Discharge Date: 02/13/2025    Anticipated Discharge Plan:  Transitional Care    Transportation: Anticipate Wheelchair. Transportation costs discussed? Yes. Discussed with Nita     PT Recommendations:    OT Recommendations:        Barriers to Discharge: medical stability    Prior Living Situation: house with parent(s)    Discussed  Partnership in Safe Discharge Planning  document with patient/family: No     Handoff Completed: No, handoff not indicated or clinically appropriate    Patient/Spokesperson Updated: Yes. Who? Nita/mother     Additional Information: Chart reviewed. The patient is medically cleared for discharge. He has a bed hold at [facility] and they are able to take him back today. Per family request the transportation arranged via wheelchair: [employer] window is 3:40 - 4:25 PM on 2/13/25. SW updated the patient, family, TCU admission, and care team regarding discharge plan.        Next Steps: SW will continue to follow and assist as needed.     TRINITY Schwartz

## 2025-02-13 NOTE — DISCHARGE SUMMARY
"[facility]  Hospitalist Discharge Summary      Date of Admission:  2/10/2025  Date of Discharge:  2/13/2025  Discharging Provider: Kd Leos MD  Discharge Service: Hospitalist Service    Discharge Diagnoses   ***    Clinically Significant Risk Factors     # Overweight: Estimated body mass index is 25.77 kg/m  as calculated from the following:    Height as of this encounter: 1.829 m (6').    Weight as of this encounter: 86.2 kg (190 lb).       Follow-ups Needed After Discharge   Follow-up Appointments       Follow Up and recommended labs and tests      Follow up with Nursing home physician in one week.  No follow up labs or test are needed.            {Additional follow-up instructions/to-do's for PCP    :***}    Unresulted Labs Ordered in the Past 30 Days of this Admission       No orders found from 1/11/2025 to 2/11/2025.        These results will be followed up by ***    Discharge Disposition   {Discharge to:2394861::\"Discharged to home\"}  Condition at discharge: {CONDITION:833600::\"Stable\"}    Hospital Course   {OPTIONAL -- use to select A&P section   :033373}    Consultations This Hospital Stay   NEUROLOGY IP CONSULT  CARE MANAGEMENT / SOCIAL WORK IP CONSULT  CARE MANAGEMENT / SOCIAL WORK IP CONSULT  PHYSICAL THERAPY ADULT IP CONSULT  OCCUPATIONAL THERAPY ADULT IP CONSULT    Code Status   Full Code    Time Spent on this Encounter   {How much time did you spend on discharge?:84609614}       Kd Leos MD  [address] [address]  Phone: [phone]  Fax: [phone]  ______________________________________________________________________    Physical Exam   Vital Signs: Temp: 98  F (36.7  C) Temp src: Oral BP: (!) 134/92 Pulse: 81   Resp: 18 SpO2: 97 % O2 Device: None (Room air)    Weight: 190 lbs 0 oz  {Recommend personal SmartPhrase or Notewriter for exam (OPTIONAL)   :878853}       Primary Care Physician   Rohan " Socrates Diaz    Discharge Orders      General info for SNF    Length of Stay Estimate: Short Term Care: Estimated # of Days <30  Condition at Discharge: Improving  Level of care:skilled   Rehabilitation Potential: Good  Admission H&P remains valid and up-to-date: Yes  Recent Chemotherapy: N/A  Use Nursing Home Standing Orders: Yes     Mantoux instructions    Give two-step Mantoux (PPD) Per Facility Policy Yes     Reason for your hospital stay    Evaluation of altered mental status     Activity - Up with nursing assistance     Follow Up and recommended labs and tests    Follow up with Nursing home physician in one week.  No follow up labs or test are needed.     Physical Therapy Adult Consult    Evaluate and treat as clinically indicated.    Reason:  Generalized weakness     Occupational Therapy Adult Consult    Evaluate and treat as clinically indicated.    Reason:  Generalized weakness     Fall precautions     Diet    Follow this diet upon discharge: Current Diet:Orders Placed This Encounter      Combination Diet Regular Diet Adult       Significant Results and Procedures   {Data for Discharge Summary:680722}    Discharge Medications   Current Discharge Medication List        START taking these medications    Details   gabapentin (NEURONTIN) 100 MG capsule Take 4 capsules (400 mg) by mouth every 8 hours for 2 days, THEN 2 capsules (200 mg) every 8 hours for 2 days, THEN 1 capsule (100 mg) every 8 hours for 2 days.    Associated Diagnoses: Alcohol dependence with acute alcoholic intoxication without complication (H)           CONTINUE these medications which have CHANGED    Details   thiamine (B-1) 100 MG tablet Take 3 tablets (300 mg) by mouth daily for 4 days, THEN 1 tablet (100 mg) daily.    Associated Diagnoses: Vitamin deficiency           CONTINUE these medications which have NOT CHANGED    Details   acetaminophen (TYLENOL) 500 MG tablet Take 1 tablet (500 mg) by mouth every 6 hours as needed for mild pain.     Associated Diagnoses: Pain      folic acid (FOLVITE) 1 MG tablet Take 1 tablet (1 mg) by mouth daily.    Associated Diagnoses: Vitamin deficiency      ketoconazole (NIZORAL) 2 % external shampoo Apply topically every evening. To beard x2 weeks for seborrheic dermatitis      multivitamin w/minerals (THERA-VIT-M) tablet Take 1 tablet by mouth daily.    Associated Diagnoses: Vitamin deficiency      tamsulosin (FLOMAX) 0.4 MG capsule Take 1 capsule (0.4 mg) by mouth every evening.    Associated Diagnoses: Acute urinary retention           Allergies   No Known Allergies   0.68   ANIONGAP  --  6*  --  10 10   GEO  --  9.1  --  9.8 9.4   GLC  --  100*  --  109* 99     Most Recent 2 LFT's:  Recent Labs   Lab Test 02/11/25  0620 02/10/25  2249   AST 57* 81*   ALT 82* 102*   ALKPHOS 51 64   BILITOTAL 0.7 0.5   ,   Results for orders placed or performed during the hospital encounter of 02/10/25   CT Head w/o Contrast    Narrative    EXAM: CT HEAD W/O CONTRAST  LOCATION: [facility]  DATE: 2/11/2025    INDICATION: confusion  COMPARISON: 1/22/2025  TECHNIQUE: Routine CT Head without IV contrast. Multiplanar reformats. Dose reduction techniques were used.    FINDINGS:  INTRACRANIAL CONTENTS: No intracranial hemorrhage, extraaxial collection, or mass effect.  No CT evidence of acute infarct. Mild to moderate presumed chronic small vessel ischemic changes. Moderate generalized volume loss. No hydrocephalus.     VISUALIZED ORBITS/SINUSES/MASTOIDS: No intraorbital abnormality. No paranasal sinus mucosal disease. No middle ear or mastoid effusion.    BONES/SOFT TISSUES: No acute abnormality.      Impression    IMPRESSION:  No acute intracranial process.     XR Chest 2 Views    Narrative    EXAM: XR CHEST 2 VIEWS  LOCATION: [facility]  DATE: 2/11/2025    INDICATION: cp  COMPARISON: 7/8/2020.    FINDINGS: The heart size is normal. Scarring at the left lung base. The lungs are otherwise clear. There is no pneumothorax or pleural effusion. Postoperative change in the left clavicle.      Impression    IMPRESSION: No acute abnormality.   MR Brain w/o Contrast    Narrative    EXAM: MR BRAIN W/O CONTRAST  LOCATION: [facility]  DATE: 2/12/2025    INDICATION: ethanol use, memory loss, eval for stroke or mamillary body hemorrhage  COMPARISON: Head CT dated 12/11/2025  TECHNIQUE: Routine multiplanar multisequence head MRI without intravenous contrast.    FINDINGS:  INTRACRANIAL CONTENTS: No acute or subacute infarct. No mass, acute  hemorrhage, or extra-axial fluid collections. Patchy nonspecific T2/FLAIR hyperintensities within the cerebral white matter and oma most consistent with mild to moderate chronic   microvascular ischemic change. Mild to moderate generalized cerebral atrophy. No hydrocephalus. Normal position of the cerebellar tonsils.     SELLA: No abnormality accounting for technique.    OSSEOUS STRUCTURES/SOFT TISSUES: Normal marrow signal. The major intracranial vascular flow voids are maintained.     ORBITS: No abnormality accounting for technique.     SINUSES/MASTOIDS: No paranasal sinus mucosal disease. No middle ear or mastoid effusion.       Impression    IMPRESSION:  1.  No acute intracranial process.  2.  Generalized cerebral atrophy and presumed mild to moderate microvascular ischemic changes.     Echocardiogram Complete    Narrative    [id number]  [id number]  [id number]  [id number]^TIAN^[place] [address]     Name: DILLON JESUS  MRN: [id number]  : 1965  Study Date: 2025 02:17 PM  Age: 59 yrs  Gender: Male  Patient Location: [facility]  Reason For Study: Chest Pain  Ordering Physician: ALEXEI OVERTON  Performed By: ACE     BSA: 2.1 m2  Height: 72 in  Weight: 190 lb  HR: 70  BP: 93/63 mmHg  ______________________________________________________________________________  Procedure  Echocardiogram with two-dimensional, color and spectral Doppler.  ______________________________________________________________________________  Interpretation Summary     1. Normal left ventricular size and systolic performance with a visually  estimated ejection fraction of 60-65%.  2. No significant valvular heart disease is identified on this study.  3. Normal right ventricular size and systolic performance.  ______________________________________________________________________________  Left ventricle:  Normal left ventricular size and systolic performance with a visually  estimated  ejection fraction of 60-65%. There is normal regional wall motion.  Left ventricular wall thickness is normal.     Assessment of LV Diastolic Function: The cumulative findings suggest normal  diastolic filling [The septal e' velocity is > 7 cm/s & lateral e' velocity  is  > 10 cm/s. The average E/e' is < 14. The TR velocity cannot be determined due  to insufficient tricuspid insufficiency signal. Left atrial volume index is  less than 34 mL/mÂ ].     Right ventricle:  Normal right ventricular size and systolic performance.     Left atrium:  The left atrium is of normal size.     Right atrium:  The right atrium is of normal size.     IVC:  The IVC is of normal caliber.     Aortic valve:  The aortic valve is comprised of three cusps. No significant aortic stenosis  or aortic insufficiency is detected on this study.     Mitral valve:  The mitral valve appears morphologically normal. There is trace-mild mitral  insufficiency.     Tricuspid valve:  The tricuspid valve is grossly morphologically normal. There is trace  tricuspid insufficiency.     Pulmonic valve:  The pulmonic valve is grossly morphologically normal. There is mild pulmonic  insufficiency.     Thoracic aorta:  The aortic root and proximal ascending aorta are of normal dimension.     Pericardium:  There is no significant pericardial effusion.  ______________________________________________________________________________  ______________________________________________________________________________  MMode/2D Measurements & Calculations  IVSd: 1.2 cm  LVIDd: 4.0 cm  LVIDs: 2.6 cm  LVPWd: 1.0 cm  FS: 35.6 %  LV mass(C)d: 150.8 grams  LV mass(C)dI: 72.4 grams/m2  Ao root diam: 3.0 cm  LA dimension: 3.7 cm  asc Aorta Diam: 3.4 cm  LA/Ao: 1.2  LVOT diam: 2.1 cm  LVOT area: 3.5 cm2  Ao root diam index Ht(cm/m): 1.6  Ao root diam index BSA (cm/m2): 1.4  Asc Ao diam index BSA (cm/m2): 1.6  Asc Ao diam index Ht(cm/m): 1.9  EF Biplane: 65.9 %  LA Volume (BP): 46.1  ml     LA Volume Index (BP): 22.2 ml/m2  LA Volume Indexed (AL/bp): 23.4 ml/m2  RV Base: 3.5 cm  RWT: 0.50     TAPSE: 2.2 cm     Time Measurements  MM HR: 65.0 BPM     Doppler Measurements & Calculations  MV E max hermann: 78.6 cm/sec  MV A max hermann: 72.8 cm/sec  MV E/A: 1.1  MV dec slope: 400.0 cm/sec2  MV dec time: 0.20 sec  Ao V2 max: 118.0 cm/sec  Ao max P.0 mmHg  Ao V2 mean: 86.6 cm/sec  Ao mean PG: 3.0 mmHg  Ao V2 VTI: 27.6 cm  LAVERN(I,D): 2.6 cm2  LAVERN(V,D): 2.7 cm2  LV V1 max PG: 3.3 mmHg  LV V1 max: 91.0 cm/sec  LV V1 VTI: 21.1 cm  SV(LVOT): 73.1 ml  SI(LVOT): 35.1 ml/m2  PA acc time: 0.14 sec  AV Hermann Ratio (DI): 0.77  LAVERN Index (cm2/m2): 1.3  E/E': 6.5  E/E' av.0     Lateral E/e': 6.5  Medial E/e': 9.5  Peak E' Hermann: 12.1 cm/sec  RV S Hermann: 14.1 cm/sec     ______________________________________________________________________________  Report approved by: Markie Vivar MD on 2025 03:17 PM             Discharge Medications   Current Discharge Medication List        START taking these medications    Details   gabapentin (NEURONTIN) 100 MG capsule Take 4 capsules (400 mg) by mouth every 8 hours for 2 days, THEN 2 capsules (200 mg) every 8 hours for 2 days, THEN 1 capsule (100 mg) every 8 hours for 2 days.    Associated Diagnoses: Alcohol dependence with acute alcoholic intoxication without complication (H)           CONTINUE these medications which have CHANGED    Details   thiamine (B-1) 100 MG tablet Take 3 tablets (300 mg) by mouth daily for 4 days, THEN 1 tablet (100 mg) daily.    Associated Diagnoses: Vitamin deficiency           CONTINUE these medications which have NOT CHANGED    Details   acetaminophen (TYLENOL) 500 MG tablet Take 1 tablet (500 mg) by mouth every 6 hours as needed for mild pain.    Associated Diagnoses: Pain      folic acid (FOLVITE) 1 MG tablet Take 1 tablet (1 mg) by mouth daily.    Associated Diagnoses: Vitamin deficiency      ketoconazole (NIZORAL) 2 % external shampoo  Apply topically every evening. To beard x2 weeks for seborrheic dermatitis      multivitamin w/minerals (THERA-VIT-M) tablet Take 1 tablet by mouth daily.    Associated Diagnoses: Vitamin deficiency      tamsulosin (FLOMAX) 0.4 MG capsule Take 1 capsule (0.4 mg) by mouth every evening.    Associated Diagnoses: Acute urinary retention           Allergies   No Known Allergies

## 2025-02-13 NOTE — PLAN OF CARE
Problem: Comorbidity Management  Goal: Maintenance of Behavioral Health Symptom Control  Outcome: Progressing  Intervention: Maintain Behavioral Health Symptom Control  Recent Flowsheet Documentation  Taken 2/13/2025 0846 by Isela Garcia RN  Medication Review/Management:   high-risk medications identified   medications reviewed     Problem: Alcohol Withdrawal  Goal: Alcohol Withdrawal Symptom Control  Outcome: Progressing  Intervention: Minimize or Manage Alcohol Withdrawal Symptoms  Recent Flowsheet Documentation  Taken 2/13/2025 0846 by Isela Garcia RN  Sensory Stimulation Regulation:   quiet environment promoted   care clustered  Goal: Optimal Neurologic Function  Intervention: Minimize or Manage Acute Neurologic Symptoms  Recent Flowsheet Documentation  Taken 2/13/2025 0846 by Isela Garcia RN  Sensory Stimulation Regulation:   quiet environment promoted   care clustered   Goal Outcome Evaluation:         BP (!) 134/92 (BP Location: Right arm)   Pulse 81   Temp 98  F (36.7  C) (Oral)   Resp 18   Ht 1.829 m (6')   Wt 86.2 kg (190 lb)   SpO2 97%   BMI 25.77 kg/m    VSS Short term memory and orientation remain considerable at times as indicated by scoring a (5) x1 and (2) x1 on CIWA for agitation, slight tremor and orientation. Patient has received discharge orders and will be discharging to TCU with transportation ride time for this afternoon.  All belongings remained with patient.  Denies pain, good hydration, good appetite.  Su Garcia RN

## 2025-02-13 NOTE — PLAN OF CARE
Problem: Alcohol Withdrawal  Goal: Alcohol Withdrawal Symptom Control  Outcome: Progressing   Goal Outcome Evaluation:       Patient alert to self overnight. Place and situation seem to fluctuate throughout the shift, continues to be confused with time. Denied pain and nausea. CIWA scores overnight were 5 and 6 for tremors and confusion. Ambulated to bathroom. Call light within reach and bed alarm activated.

## 2025-02-13 NOTE — PROGRESS NOTES
[facility]  [place]    Carlos Ivy MRN# [id number]   Age: 59 year old YOB: 1965               Assessment and Plan:      Confusion, history of alcohol use     Likely has some wernicke encephalopathy  Continue thiamine indefinitely    CT head showed no acute issues  MRI shows some atrophy as expected, no acute issues.     He does show significant deficits in short term memory and orientation.   [employer] suggests EtOH withdrawal     At this point I don't think he would manage living independently, TCU would be appropriate discharge destination, from there can see if he improves or if he needs a supervised setting permanently    No new work up to recommend for now, I'll sign off. Please call if questions            Chief Complaint/HPI:     2/13/25: again quite awake today, enjoying breakfast tray, no headache or other complaints for me.     2/12/25: Pt more awake today, no particular complaints, he does not remember me from yesterday    2/11/25:  This patient is a 59-year-old gentleman seen for neurologic evaluation today.  He was brought to the hospital yesterday from his assisted living facility with report of confusion.  He was noted to be disoriented.  He does have a significant history of alcohol use, and concern for Wernicke's encephalopathy was raised.  He has received high-dose thiamine supplementation already.  Initial head CT is unremarkable.  Labs show modest transaminase elevations.  He does not advocate headache or other complaints, but he is quite sleepy and minimally responsive for me today.  Looks like he was admitted here to the hospital on January 22 of this year with concern for alcohol withdrawal seizure as he had an episode of unresponsiveness.  Subsequently, EEG was unremarkable.               Allergies:   No Known Allergies          Medications:     Current Facility-Administered Medications:     acetaminophen (TYLENOL) tablet 650 mg, 650 mg, Oral, Q4H PRN  **OR** acetaminophen (TYLENOL) Suppository 650 mg, 650 mg, Rectal, Q4H PRN, Francisco Javier Tate MD    benzocaine-menthol (CHLORASEPTIC) 6-10 MG lozenge 1 lozenge, 1 lozenge, Buccal, Q1H PRN, Francisco Javier Tate MD    calcium carbonate (TUMS) chewable tablet 1,000 mg, 1,000 mg, Oral, 4x Daily PRN, Francisco Javier Tate MD    diphenhydrAMINE (BENADRYL) capsule 25 mg, 25 mg, Oral, Q6H PRN, Kd Leos MD    enoxaparin ANTICOAGULANT (LOVENOX) injection 40 mg, 40 mg, Subcutaneous, Q24H, Francisco Javier Tate MD, 40 mg at 02/13/25 0846    flumazenil (ROMAZICON) injection 0.2 mg, 0.2 mg, Intravenous, q1 min prn, Francisco Javier Tate MD    flumazenil (ROMAZICON) injection 0.2 mg, 0.2 mg, Intravenous, q1 min prn, Francisco Javier Tate MD    folic acid (FOLVITE) tablet 1 mg, 1 mg, Oral, Daily, Francisco Javier Tate MD, 1 mg at 02/13/25 0848    [START ON 2/18/2025] gabapentin (NEURONTIN) capsule 100 mg, 100 mg, Oral, Q8H, Francisco Javier Tate MD    [START ON 2/16/2025] gabapentin (NEURONTIN) capsule 300 mg, 300 mg, Oral, Q8H, Francisco Javier Tate MD    [START ON 2/14/2025] gabapentin (NEURONTIN) capsule 600 mg, 600 mg, Oral, Q8H, Francisco Javier Tate MD    gabapentin (NEURONTIN) capsule 900 mg, 900 mg, Oral, Q8H, Francisco Javier Tate MD, 900 mg at 02/13/25 0344    guaiFENesin (ROBITUSSIN) 20 mg/mL solution 200 mg, 200 mg, Oral, Q4H PRN, Francisco Javier Tate MD    OLANZapine zydis (zyPREXA) ODT tab 5-10 mg, 5-10 mg, Oral, Q6H PRN **OR** haloperidol lactate (HALDOL) injection 2.5-5 mg, 2.5-5 mg, Intravenous, Q6H PRN, Francisco Javier Tate MD    hydrALAZINE (APRESOLINE) tablet 10 mg, 10 mg, Oral, Q4H PRN **OR** hydrALAZINE (APRESOLINE) injection 10 mg, 10 mg, Intravenous, Q4H PRN, Francisco Javier Tate MD    lidocaine (LMX4) cream, , Topical, Q1H PRN, Francisco Javier Ttae MD    lidocaine 1 % 0.1-1 mL, 0.1-1 mL, Other, Q1H PRN, Francisco Javier Tate MD    LORazepam (ATIVAN) tablet 1-2 mg, 1-2 mg, Oral, Q30 Min PRN, 1 mg at 02/12/25 2003 **OR** LORazepam (ATIVAN) injection 1-2 mg, 1-2 mg, Intravenous, Q30 Min  PRN, Francisco Javier Tate MD, 2 mg at 02/11/25 1041    melatonin tablet 5 mg, 5 mg, Oral, At Bedtime PRN, Francisco Javier Tate MD    multivitamin w/minerals (THERA-VIT-M) tablet 1 tablet, 1 tablet, Oral, Daily, Francisco Javier Tate MD, 1 tablet at 02/13/25 0846    nicotine (NICODERM CQ) 21 MG/24HR 24 hr patch 1 patch, 1 patch, Transdermal, Daily, Franck Bearden MD, 1 patch at 02/13/25 0853    nicotine (NICORETTE) gum 2 mg, 2 mg, Buccal, Q1H PRN, Francisco Javier Tate MD, 2 mg at 02/11/25 0327    ondansetron (ZOFRAN ODT) ODT tab 4 mg, 4 mg, Oral, Q6H PRN **OR** ondansetron (ZOFRAN) injection 4 mg, 4 mg, Intravenous, Q6H PRN, Francisco Javier Tate MD    senna-docusate (SENOKOT-S/PERICOLACE) 8.6-50 MG per tablet 1 tablet, 1 tablet, Oral, BID PRN **OR** senna-docusate (SENOKOT-S/PERICOLACE) 8.6-50 MG per tablet 2 tablet, 2 tablet, Oral, BID PRN, Francisco Javier Tate MD    sodium chloride (PF) 0.9% PF flush 3 mL, 3 mL, Intracatheter, Q8H, Francisco Javier Tate MD, 3 mL at 02/13/25 0345    sodium chloride (PF) 0.9% PF flush 3 mL, 3 mL, Intracatheter, q1 min prn, Francisco Javier Tate MD, 3 mL at 02/11/25 0328    tamsulosin (FLOMAX) capsule 0.4 mg, 0.4 mg, Oral, QPM, Francisco Javier Tate MD, 0.4 mg at 02/12/25 1947    [COMPLETED] thiamine (B-1) injection 500 mg, 500 mg, Intravenous, TID, 500 mg at 02/12/25 1946 **FOLLOWED BY** thiamine (B-1) injection 250 mg, 250 mg, Intravenous, Daily, 250 mg at 02/13/25 0846 **FOLLOWED BY** [START ON 2/18/2025] thiamine (B-1) tablet 100 mg, 100 mg, Oral, Daily, Francisco Javier Tate MD              Physical Exam:      Vitals: BP (!) 134/92 (BP Location: Right arm)   Pulse 81   Temp 98  F (36.7  C) (Oral)   Resp 18   Ht 1.829 m (6')   Wt 86.2 kg (190 lb)   SpO2 97%   BMI 25.77 kg/m    BMI= Body mass index is 25.77 kg/m .       Patient is awake and alert up in chair enjoying breakfast ,  Neurological Exam:   Mental status: again quite awake today  Eyes open, able to converse normally  Unable to name the hospital or remember after I  tell him  Guesses month is March, unable to remember February after I tell him.   Cannot name the president or pick him out of a list of recent presidents.   CN II: PERRLA.   CN III, IV, VI: Eyes are midposition, conjugate, no nystagmus seen   CN VII: Face is symmetric   Motor: no focal or lateralized weakness in extremities     Coordination: no ataxia or dysmetria, moderate postural tremor      Miguel Angel Dolan MD       More than 35 minutes spent reviewing records and results, evaluating patient, discussing with pt and on documentation

## 2025-02-17 ENCOUNTER — DOCUMENTATION ONLY (OUTPATIENT)
Dept: OTHER | Facility: CLINIC | Age: 60
End: 2025-02-17
Payer: COMMERCIAL

## 2025-02-17 PROBLEM — N40.0 BPH (BENIGN PROSTATIC HYPERPLASIA): Status: ACTIVE | Noted: 2025-02-17

## 2025-02-20 LAB
ATRIAL RATE - MUSE: 84 BPM
DIASTOLIC BLOOD PRESSURE - MUSE: 90 MMHG
INTERPRETATION ECG - MUSE: NORMAL
P AXIS - MUSE: 78 DEGREES
PR INTERVAL - MUSE: 180 MS
QRS DURATION - MUSE: 78 MS
QT - MUSE: 396 MS
QTC - MUSE: 467 MS
R AXIS - MUSE: 60 DEGREES
SYSTOLIC BLOOD PRESSURE - MUSE: 143 MMHG
T AXIS - MUSE: 68 DEGREES
VENTRICULAR RATE- MUSE: 84 BPM

## 2025-02-21 PROBLEM — F10.27: Status: ACTIVE | Noted: 2025-02-21

## 2025-02-27 ENCOUNTER — TELEPHONE (OUTPATIENT)
Dept: GERIATRICS | Facility: CLINIC | Age: 60
End: 2025-02-27
Payer: COMMERCIAL

## 2025-02-28 NOTE — TELEPHONE ENCOUNTER
Called by nursing home nurse with report that patient has complained of nausea since this afternoon and also had 1 loose stool.  History is somewhat unreliable secondary to cognitive impairment.  No reported emesis.  Viral gastroenteritis has been circulating in the building  Plan: Zofran 4 mg 4 times daily as needed nausea for 3 days.  Monitor GI status.  Nursing staff to call if further GI symptoms documented

## 2025-03-06 ENCOUNTER — NURSING HOME VISIT (OUTPATIENT)
Dept: GERIATRICS | Facility: CLINIC | Age: 60
End: 2025-03-06
Payer: COMMERCIAL

## 2025-03-06 VITALS
TEMPERATURE: 97.9 F | RESPIRATION RATE: 20 BRPM | DIASTOLIC BLOOD PRESSURE: 80 MMHG | WEIGHT: 168 LBS | HEIGHT: 72 IN | BODY MASS INDEX: 22.75 KG/M2 | OXYGEN SATURATION: 100 % | HEART RATE: 86 BPM | SYSTOLIC BLOOD PRESSURE: 122 MMHG

## 2025-03-06 DIAGNOSIS — R33.9 URINARY RETENTION: ICD-10-CM

## 2025-03-06 DIAGNOSIS — I10 PRIMARY HYPERTENSION: ICD-10-CM

## 2025-03-06 DIAGNOSIS — F10.21 ALCOHOL USE DISORDER, SEVERE, IN EARLY REMISSION (H): ICD-10-CM

## 2025-03-06 DIAGNOSIS — E51.2 WERNICKE ENCEPHALOPATHY: Primary | ICD-10-CM

## 2025-03-06 DIAGNOSIS — G47.01 INSOMNIA DUE TO MEDICAL CONDITION: ICD-10-CM

## 2025-03-06 DIAGNOSIS — R31.0 GROSS HEMATURIA: ICD-10-CM

## 2025-03-06 DIAGNOSIS — F33.9 RECURRENT MAJOR DEPRESSIVE DISORDER, REMISSION STATUS UNSPECIFIED: ICD-10-CM

## 2025-03-06 DIAGNOSIS — D64.9 ANEMIA, UNSPECIFIED TYPE: ICD-10-CM

## 2025-03-06 DIAGNOSIS — R53.81 PHYSICAL DECONDITIONING: ICD-10-CM

## 2025-03-06 PROCEDURE — 99305 1ST NF CARE MODERATE MDM 35: CPT | Performed by: INTERNAL MEDICINE

## 2025-03-06 NOTE — PROGRESS NOTES
[facility] G[facility]IATR[facility]  INITIAL VISIT NOTE  March 6, 2025      PRIMARY CARE PROVIDER AND CLINIC:  María Elena Heck [address] [address]    [facility] Medical Record Number:  [id number]  Place of Service where encounter took place:  [facility] () [[id number]]    Chief Complaint   Patient presents with    [facility]       HPI:    Carlos Ivy is a 59 year old  (1965) male seen today at *** Medical history is notable for ***    *** admitted to this facility for  {FGS ADMISSION REASONS:002878}.      History obtained from: {FGS HPI:942876}.      Today, Mr. Ivy is seen in his room laying atop his bed. Previously saw him doing a timed walk with PT - he was walking briskly around the unit without assistive device. He was out of breath resting in bed. No chest pain. No cough. Limited historian with short answers. No acute concerns per discussion with nursing.     CODE STATUS: DNR / DNI    ALLERGIES:  No Known Allergies    PAST MEDICAL HISTORY:   Past Medical History:   Diagnosis Date    Alcohol abuse     Anxiety     Back pain, chronic     Depression     Hypertension     Substance abuse (H)        PAST SURGICAL HISTORY:   Past Surgical History:   Procedure Laterality Date    EXTRACTION(S) DENTAL      TONSILLECTOMY         SOCIAL HISTORY:   Patient's living condition: lives in a skilled nursing facility    MEDICATIONS:  Post Discharge Medication Reconciliation Status: discharge medications reconciled and changed, per note/orders.  Current Outpatient Medications   Medication Sig Dispense Refill    folic acid (FOLVITE) 1 MG tablet Take 1 tablet (1 mg) by mouth daily.      gabapentin (NEURONTIN) 100 MG capsule Take 100 mg by mouth 3 times daily.      ketoconazole (NIZORAL) 2 % external shampoo Apply topically every evening. To beard x2 weeks for seborrheic dermatitis      Lidocaine (LIDOCARE) 4 % Patch Place 1 patch onto the skin every 24 hours. To prevent lidocaine  toxicity, patient should be patch free for 12 hrs daily.      melatonin 3 MG tablet Take 6 mg by mouth at bedtime.      nicotine (NICODERM CQ) 14 MG/24HR 24 hr patch Place 1 patch onto the skin every 24 hours.      QUEtiapine (SEROQUEL) 25 MG tablet Take 25 mg by mouth every 6 hours as needed (agitation).      tamsulosin (FLOMAX) 0.4 MG capsule Take 1 capsule (0.4 mg) by mouth every evening.      thiamine (B-1) 100 MG tablet Take 200 mg by mouth daily.      traZODone (DESYREL) 50 MG tablet Take 50 mg by mouth at bedtime. And take 25mg in the afternoon         ROS:  6 point ROS neg other than the symptoms noted above in the HPI.***  Unable to obtain due to cognitive impairment or aphasia    PHYSICAL EXAM:  /80   Pulse 86   Temp 97.9  F (36.6  C)   Resp 20   Ht 1.829 m (6')   Wt 76.2 kg (168 lb)   SpO2 100%   BMI 22.78 kg/m    Gen: sitting in bed ***wheelchair, alert, cooperative and in no acute distress  HEENT: *** hearing acuity  Card: RRR, S1, S2, no murmurs  Resp: lungs clear to auscultation bilaterally ***anteriorly and laterally, no crackles or wheezes; moving good air  GI: abdomen soft, not-tender, non-distended, +BS  Ext: no LE edema  Neuro: CX II-XII grossly in tact; ROM in all four extremities grossly in tact  Psych: alert and oriented x3; normal affect ***memory, judgement and insight impaired ***alert and oriented to self and general situation     LABORATORY/IMAGING DATA:  Reviewed as per [facility] and/or [facility]    ASSESSMENT/PLAN:    Orders:   ***    -- trazodone 25 in the afternoon and 50 mg at bedtime  -- quetiapine 25 mg q6h PRN for agitation     -- folic acid 1 mg daily, thiamine 200 mg daily ***    -- gabapentin 100 mg TID    -- lidoderm patch on q12h/alfk96e    BPH  -- tamsulosin 0.4mg daily    Insomnia  -- mleatonin 6 mg       Electronically signed by:  Yamel Rodríguez MD                       by mouth daily.      traZODone (DESYREL) 50 MG tablet Take 50 mg by mouth at bedtime. And take 25mg in the afternoon         ROS:  Unable to obtain due to cognitive impairment or aphasia    PHYSICAL EXAM:  /80   Pulse 86   Temp 97.9  F (36.6  C)   Resp 20   Ht 1.829 m (6')   Wt 76.2 kg (168 lb)   SpO2 100%   BMI 22.78 kg/m    Gen: laying on top of his bed after walking about the unit with PT, alert, cooperative and in no acute distress  Resp: breathing non labored, no tachypnea   Ext: no LE edema  Neuro: CX II-XII grossly in tact; ROM in all four extremities grossly in tact  Psych: memory, judgement and insight impaired      LABORATORY/IMAGING DATA:  Reviewed as per [facility] and/or [facility]    ASSESSMENT/PLAN:    Wernicke's Encephalopathy  Severe EtOH Use Disorder  Major Depressive Disorder vs EtOH Induced Mood Disorder  Was living with his mom. 3 recent hospital stays, 2 TCU stays. Today, oriented to self. BIMS 6/15. New on quetiapine and trazodone at [facility].   -- gabapentin 100 mg TID  -- trazodone 25 in the afternoon and 50 mg at bedtime  -- quetiapine 25 mg q6h PRN for agitation   -- folic acid 1 mg daily, thiamine 200 mg daily     -- lidoderm patch on q12h/cvut26s    Urinary Retention   Gross Hematuria  Initial hospitalization in January with gross hematuria. New on tamsulosin at that time.   -- tamsulosin 0.4mg daily  -- follow up with urology per goals of care    HTN  By history. Used to be Rx'd amlodipine but not on any anti-HTN for some time. SBPs 130s.   -- follow clinically as he gets settled in; would not add anti-HTN med right now    Acute on ?Chronic Anemia  Hgb 11.7 on 2/15. Improved from 9.5 on 2/11 - at that time felt ?dilutional.   -- consider repeat Hgb in a month or so    Insomnia  New on melatonin at [facility]  -- mleatonin 6 mg     Physical Deconditioning  In setting of hospitalization and underlying medical conditions  -- ongoing PT/OT      Electronically signed by:  Yamel Sheffield  MD Marcos

## 2025-03-06 NOTE — LETTER
3/6/2025      Carlos Ivy  [address] [address]        [facility] GERIATRICS  INITIAL VISIT NOTE  March 6, 2025      PRIMARY CARE PROVIDER AND CLINIC:  María Elena Heck [address] [place] [facility] [address]    [facility] Medical Record Number:  [id number]  Place of Service where encounter took place:  No question data found.    No chief complaint on file.      HPI:    Carlos Ivy is a 59 year old  (1965) male seen today at *** Medical history is notable for ***    *** admitted to this facility for  {FGS ADMISSION REASONS:536265}.      History obtained from: {FGS HPI:134441}.      Today, ***    CODE STATUS: {CODE STATUS:047227}    ALLERGIES:  No Known Allergies    PAST MEDICAL HISTORY:   Past Medical History:   Diagnosis Date    Alcohol abuse     Anxiety     Back pain, chronic     Depression     Hypertension     Substance abuse (H)        PAST SURGICAL HISTORY:   Past Surgical History:   Procedure Laterality Date    EXTRACTION(S) DENTAL      TONSILLECTOMY         FAMILY HISTORY:   No family history on file.  Unable to review due to cognitive impairment***    SOCIAL HISTORY:   Patient's living condition: {LIVES WITH (NURSING HOME):371114}    MEDICATIONS:  Post Discharge Medication Reconciliation Status: {ACO Med Rec (Provider):596194}.  Current Outpatient Medications   Medication Sig Dispense Refill    folic acid (FOLVITE) 1 MG tablet Take 1 tablet (1 mg) by mouth daily.      gabapentin (NEURONTIN) 100 MG capsule Take 100 mg by mouth 3 times daily.      ketoconazole (NIZORAL) 2 % external shampoo Apply topically every evening. To beard x2 weeks for seborrheic dermatitis      Lidocaine (LIDOCARE) 4 % Patch Place 1 patch onto the skin every 24 hours. To prevent lidocaine toxicity, patient should be patch free for 12 hrs daily.      melatonin 3 MG tablet Take 6 mg by mouth at bedtime.      nicotine (NICODERM CQ) 14 MG/24HR 24 hr patch Place 1 patch onto the skin  every 24 hours.      QUEtiapine (SEROQUEL) 25 MG tablet Take 25 mg by mouth every 6 hours as needed (agitation).      tamsulosin (FLOMAX) 0.4 MG capsule Take 1 capsule (0.4 mg) by mouth every evening.      thiamine (B-1) 100 MG tablet Take 200 mg by mouth daily.      traZODone (DESYREL) 50 MG tablet Take 50 mg by mouth at bedtime. And take 25mg in the afternoon         ROS:  6 point ROS neg other than the symptoms noted above in the HPI.***  Unable to obtain due to cognitive impairment or aphasia    PHYSICAL EXAM:  There were no vitals taken for this visit.  Gen: sitting in bed ***wheelchair, alert, cooperative and in no acute distress  HEENT: *** hearing acuity  Card: RRR, S1, S2, no murmurs  Resp: lungs clear to auscultation bilaterally ***anteriorly and laterally, no crackles or wheezes; moving good air  GI: abdomen soft, not-tender, non-distended, +BS  Ext: no LE edema  Neuro: CX II-XII grossly in tact; ROM in all four extremities grossly in tact  Psych: alert and oriented x3; normal affect ***memory, judgement and insight impaired ***alert and oriented to self and general situation     LABORATORY/IMAGING DATA:  [facility]    ASSESSMENT/PLAN:    Orders:   ***      Electronically signed by:  Yamel Rodríguez MD                          Sincerely,        Yamel Rodríguez MD    Electronically signed   capsule Take 1 capsule (0.4 mg) by mouth every evening.       thiamine (B-1) 100 MG tablet Take 200 mg by mouth daily.       traZODone (DESYREL) 50 MG tablet Take 50 mg by mouth at bedtime. And take 25mg in the afternoon         ROS:  Unable to obtain due to cognitive impairment or aphasia    PHYSICAL EXAM:  /80   Pulse 86   Temp 97.9  F (36.6  C)   Resp 20   Ht 1.829 m (6')   Wt 76.2 kg (168 lb)   SpO2 100%   BMI 22.78 kg/m    Gen: laying on top of his bed after walking about the unit with PT, alert, cooperative and in no acute distress  Resp: breathing non labored, no tachypnea   Ext: no LE edema  Neuro: CX II-XII grossly in tact; ROM in all four extremities grossly in tact  Psych: memory, judgement and insight impaired      LABORATORY/IMAGING DATA:  Reviewed as per [facility] and/or [facility]    ASSESSMENT/PLAN:    Wernicke's Encephalopathy  Severe EtOH Use Disorder  Major Depressive Disorder vs EtOH Induced Mood Disorder  Was living with his mom. 3 recent hospital stays, 2 TCU stays. Today, oriented to self. BIMS 6/15. New on quetiapine and trazodone at [facility].   -- gabapentin 100 mg TID  -- trazodone 25 in the afternoon and 50 mg at bedtime  -- quetiapine 25 mg q6h PRN for agitation   -- folic acid 1 mg daily, thiamine 200 mg daily     -- lidoderm patch on q12h/eiqp91v    Urinary Retention   Gross Hematuria  Initial hospitalization in January with gross hematuria. New on tamsulosin at that time.   -- tamsulosin 0.4mg daily  -- follow up with urology per goals of care    HTN  By history. Used to be Rx'd amlodipine but not on any anti-HTN for some time. SBPs 130s.   -- follow clinically as he gets settled in; would not add anti-HTN med right now    Acute on ?Chronic Anemia  Hgb 11.7 on 2/15. Improved from 9.5 on 2/11 - at that time felt ?dilutional.   -- consider repeat Hgb in a month or so    Insomnia  New on melatonin at [facility]  -- mleatonin 6 mg     Physical Deconditioning  In setting of  hospitalization and underlying medical conditions  -- ongoing PT/OT      Electronically signed by:  Yamel Rodríguez MD                          Sincerely,        Yamel Rodríguez MD    Electronically signed

## 2025-03-18 ENCOUNTER — TELEPHONE (OUTPATIENT)
Dept: GERIATRICS | Facility: CLINIC | Age: 60
End: 2025-03-18
Payer: COMMERCIAL
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
## 2025-04-17 NOTE — PROGRESS NOTES
[facility] GERIATRICS  Chief Complaint   Patient presents with    [facility] Medical Record Number:  [id number]  Place of Service where encounter took place:  [facility] () [[id number]]    HPI:    Carlos Ivy  is 59 year old (1965), who is being seen today for a federally mandated E/M visit. Today's concerns are:  {FGS DX:058250}    ALLERGIES:Patient has no known allergies.  PAST MEDICAL HISTORY:   Past Medical History:   Diagnosis Date    Alcohol abuse     Anxiety     Back pain, chronic     Depression     Hypertension     Substance abuse (H)      PAST SURGICAL HISTORY:   has a past surgical history that includes Tonsillectomy and Extraction(s) dental.  FAMILY HISTORY: family history is not on file.  SOCIAL HISTORY:  reports that he has been smoking cigarettes and cigarettes. He has a 20 pack-year smoking history. He has never used smokeless tobacco. He reports current alcohol use. He reports that he does not use drugs.    MEDICATIONS:  MED REC REQUIRED{TIP  Click the link below to document or use med rec list, use list to pull in response :258867}  Post Medication Reconciliation Status: {MED REC LIST:526165}         Review of your medicines            Accurate as of April 17, 2025  1:37 PM. If you have any questions, ask your nurse or doctor.                CONTINUE these medicines which have NOT CHANGED        Dose / Directions   folic acid 1 MG tablet  Commonly known as: FOLVITE  Used for: Vitamin deficiency      Dose: 1 mg  Take 1 tablet (1 mg) by mouth daily.  Refills: 0     gabapentin 100 MG capsule  Commonly known as: NEURONTIN      Dose: 100 mg  Take 100 mg by mouth 3 times daily.  Refills: 0     ketoconazole 2 % external shampoo  Commonly known as: NIZORAL      Apply topically every evening. To beard x2 weeks for seborrheic dermatitis  Refills: 0     Lidocaine 4 % Patch  Commonly known as: LIDOCARE      Dose: 1 patch  Place 1 patch onto the skin every 24  hours. To prevent lidocaine toxicity, patient should be patch free for 12 hrs daily.  Refills: 0     melatonin 3 MG tablet      Dose: 6 mg  Take 6 mg by mouth at bedtime.  Refills: 0     nicotine 14 MG/24HR 24 hr patch  Commonly known as: NICODERM CQ      Dose: 1 patch  Place 1 patch onto the skin every 24 hours.  Refills: 0     QUEtiapine 25 MG tablet  Commonly known as: SEROquel      Dose: 25 mg  Take 25 mg by mouth every 6 hours as needed (agitation).  Refills: 0     tamsulosin 0.4 MG capsule  Commonly known as: FLOMAX  Used for: Acute urinary retention      Dose: 0.4 mg  Take 1 capsule (0.4 mg) by mouth every evening.  Refills: 0     thiamine 100 MG tablet  Commonly known as: B-1      Dose: 200 mg  Take 200 mg by mouth daily.  Refills: 0     traZODone 50 MG tablet  Commonly known as: DESYREL      Dose: 50 mg  Take 50 mg by mouth at bedtime. And take 25mg in the afternoon  Refills: 0           ***    Case Management:  I have reviewed the care plan and MDS and do agree with the plan. Patient's desire to return to the community is {FGS RETURN TO COMMUNITY:378402}. Information reviewed:  Medications, vital signs, orders, and nursing notes.    ROS:  {ROS FGS:767363}    Vitals:  There were no vitals taken for this visit.  There is no height or weight on file to calculate BMI.  Exam:  {long-term physical exam :331956}    Lab/Diagnostic data:   {fgslab:533341}    ASSESSMENT/PLAN  {FGS DX2:761895}    {fgsorders:884339}  ***    Electronically signed by:  Silvina Marshall***

## 2025-04-18 ENCOUNTER — NURSING HOME VISIT (OUTPATIENT)
Dept: GERIATRICS | Facility: CLINIC | Age: 60
End: 2025-04-18
Payer: COMMERCIAL

## 2025-04-18 NOTE — LETTER
4/18/2025      Carlos Ivy  [address] [address]        No notes on file      Sincerely,        María Elena MALATHI Juarez CNP    Electronically signed

## 2025-05-01 ENCOUNTER — NURSING HOME VISIT (OUTPATIENT)
Dept: GERIATRICS | Facility: CLINIC | Age: 60
End: 2025-05-01
Payer: COMMERCIAL

## 2025-05-01 VITALS
HEIGHT: 72 IN | WEIGHT: 185.4 LBS | DIASTOLIC BLOOD PRESSURE: 78 MMHG | TEMPERATURE: 98.1 F | HEART RATE: 78 BPM | OXYGEN SATURATION: 99 % | BODY MASS INDEX: 25.11 KG/M2 | SYSTOLIC BLOOD PRESSURE: 133 MMHG | RESPIRATION RATE: 18 BRPM

## 2025-05-01 NOTE — LETTER
5/1/2025      Carlos Ivy  [address] [address]        [facility] GERIATRICS  REGULATORY VISIT  May 1, 2025      [facility] Medical Record Number:  [id number]  Place of Service where encounter took place:  No question data found.    No chief complaint on file.      HPI:    Carlos Ivy is a 59 year old  (1965), who is being seen today for a federally mandated E/M visit. HPI information obtained from: {FGS HPI:143906}.    Today, ***      ALLERGIES:  No Known Allergies     Past Medical, Surgical, Family and Social History: Reviewed and updated in EPIC.    MEDICATIONS:  Post Discharge Medication Reconciliation Status: {ACO Med Rec (Provider):[id number]}. ***    Current Outpatient Medications   Medication Sig Dispense Refill    folic acid (FOLVITE) 1 MG tablet Take 1 tablet (1 mg) by mouth daily.      ketoconazole (NIZORAL) 2 % external shampoo Apply topically every evening. To beard x2 weeks for seborrheic dermatitis      Lidocaine (LIDOCARE) 4 % Patch Place 1 patch onto the skin every 24 hours. To prevent lidocaine toxicity, patient should be patch free for 12 hrs daily.      melatonin 3 MG tablet Take 6 mg by mouth at bedtime.      nicotine (NICODERM CQ) 14 MG/24HR 24 hr patch Place 1 patch onto the skin every 24 hours.      tamsulosin (FLOMAX) 0.4 MG capsule Take 1 capsule (0.4 mg) by mouth every evening.      thiamine (B-1) 100 MG tablet Take 200 mg by mouth daily.      traZODone (DESYREL) 50 MG tablet Take 50 mg by mouth at bedtime. And take 25mg in the afternoon       Medications reviewed:  Medications reconciled to facility chart and changes were made to reflect current medications as identified as above med list. Below are the changes that were made:   Medications stopped since last EPIC medication reconciliation:   Medications Discontinued During This Encounter   Medication Reason    gabapentin (NEURONTIN) 100 MG capsule Med Rec(No AVS / No eCancel)      Medications started since last [facility] medication reconciliation:  No orders of the defined types were placed in this encounter.    ***    REVIEW OF SYSTEMS:  4 point ROS neg other than the symptoms noted above in the HPI.***  Unable to be obtained due to cognitive impairment or aphasia.     PHYSICAL EXAM:  There were no vitals taken for this visit.  Gen: sitting in bed ***wheelchair, alert, cooperative and in no acute distress  HEENT: *** hearing acuity  Card: RRR, S1, S2, no murmurs  Resp: lungs clear to auscultation bilaterally ***anteriorly and laterally, no crackles or wheezes; moving good air  GI: abdomen soft, not-tender, non-distended, +BS  Ext: no LE edema  Neuro: CX II-XII grossly in tact; ROM in all four extremities grossly in tact  Psych: alert and oriented x3; normal affect ***memory, judgement and insight impaired ***alert and oriented to self and general situation    LABS/IMAGING: Reviewed as per Epic and/or [facility]    ASSESSMENT / PLAN:  {[facility] DX:737304}    Orders:  ***    Electronically signed by  Yamel Rodríguez MD              Sincerely,        Yamel Rodríguez MD    Electronically signed

## 2025-05-01 NOTE — PROGRESS NOTES
[facility] GERIATRICS  REGULATORY VISIT  May 1, 2025      [facility] Medical Record Number:  [id number]  Place of Service where encounter took place:  [facility] () [[id number]]    Chief Complaint   Patient presents with    [facility] Regulatory       HPI:    Carlos Ivy is a 59 year old  (1965), who is being seen today for a federally mandated E/M visit. HPI information obtained from: {FGS HPI:140893}.    He was having some intermittent hypotension and gabapentin was discontinued on 4/18/2025.    Today, Mr. Ivy is seen standing in his room looking up window.  He is a limited historian due to dementia ([facility] 9/15).  Reports no tingling or burning in his hands or feet.  Reminded him he is off the gabapentin, and he said he does not notice any difference.  No chest pain.  No trouble breathing.  No acute concerns today per discussion with nursing.      ALLERGIES:  No Known Allergies     Past Medical, Surgical, Family and Social History: Reviewed and updated in EPIC.    MEDICATIONS:  Current Outpatient Medications   Medication Sig Dispense Refill    folic acid (FOLVITE) 1 MG tablet Take 1 tablet (1 mg) by mouth daily.      ketoconazole (NIZORAL) 2 % external shampoo Apply topically every evening. To beard x2 weeks for seborrheic dermatitis      Lidocaine (LIDOCARE) 4 % Patch Place 1 patch onto the skin every 24 hours. To prevent lidocaine toxicity, patient should be patch free for 12 hrs daily.      melatonin 3 MG tablet Take 6 mg by mouth at bedtime.      nicotine (NICODERM CQ) 14 MG/24HR 24 hr patch Place 1 patch onto the skin every 24 hours.      tamsulosin (FLOMAX) 0.4 MG capsule Take 1 capsule (0.4 mg) by mouth every evening.      thiamine (B-1) 100 MG tablet Take 200 mg by mouth daily.      traZODone (DESYREL) 50 MG tablet Take 50 mg by mouth at bedtime. And take 25mg in the afternoon       Medications reviewed:  Medications reconciled to facility chart and changes were made to  reflect current medications as identified as above med list. Below are the changes that were made:   Medications stopped since last EPIC medication reconciliation:   Medications Discontinued During This Encounter   Medication Reason    gabapentin (NEURONTIN) 100 MG capsule Med Rec(No AVS / No eCancel)     Medications started since last [facility] medication reconciliation:  No orders of the defined types were placed in this encounter.      REVIEW OF SYSTEMS:  Unable to be obtained due to cognitive impairment or aphasia. [facility] 9/15    PHYSICAL EXAM:  /78   Pulse 78   Temp 98.1  F (36.7  C)   Resp 18   Ht 1.829 m (6')   Wt 84.1 kg (185 lb 6.4 oz)   SpO2 99%   BMI 25.14 kg/m    Gen: sitting in his room, alert, cooperative and in no acute distress  Resp: breathing non labored, no tachypnea  Ext: no LE edema  Neuro: CX II-XII grossly in tact; ROM in all four extremities grossly in tact  Psych: memory, judgement and insight impaired; [facility] 9/15      [facility]ING: [facility] Epic [facility]    ASSESSMENT / PLAN:  {FGS DX:259753}    Orders:  ***    Electronically signed by  Yamel Rodríguez MD

## 2025-07-09 ENCOUNTER — NURSING HOME VISIT (OUTPATIENT)
Dept: GERIATRICS | Facility: CLINIC | Age: 60
End: 2025-07-09
Payer: COMMERCIAL

## 2025-07-09 VITALS
OXYGEN SATURATION: 98 % | BODY MASS INDEX: 26.57 KG/M2 | SYSTOLIC BLOOD PRESSURE: 131 MMHG | HEIGHT: 72 IN | TEMPERATURE: 98.8 F | DIASTOLIC BLOOD PRESSURE: 77 MMHG | HEART RATE: 63 BPM | RESPIRATION RATE: 18 BRPM | WEIGHT: 196.2 LBS

## 2025-07-09 DIAGNOSIS — F10.90 ALCOHOL USE DISORDER: ICD-10-CM

## 2025-07-09 DIAGNOSIS — N40.0 BENIGN PROSTATIC HYPERPLASIA WITHOUT LOWER URINARY TRACT SYMPTOMS: ICD-10-CM

## 2025-07-09 DIAGNOSIS — G47.01 INSOMNIA DUE TO MEDICAL CONDITION: Primary | ICD-10-CM

## 2025-07-09 DIAGNOSIS — F10.27 MODERATE DEMENTIA ASSOCIATED WITH ALCOHOLISM, WITHOUT BEHAVIORAL DISTURBANCE, PSYCHOTIC DISTURBANCE, MOOD DISTURBANCE, OR ANXIETY (H): ICD-10-CM

## 2025-07-09 NOTE — LETTER
7/9/2025      Carlos Ivy  [address] [address]        [facility] GERIATRICS  Chief Complaint   Patient presents with     [facility] Medical Record Number:  [id number]  Place of Service where encounter took place:  [facility] () [[id number]]    HPI:    Carlos Ivy  is 59 year old (1965), who is being seen today for a federally mandated E/M visit.     Today's concerns are:  Insomnia due to medical condition  Moderate dementia associated with alcoholism, unspecified whether behavioral, psychotic, or mood disturbance or anxiety (H)  Alcohol use disorder  Benign prostatic hyperplasia without lower urinary tract symptoms  Patient has an order for a trazodone in the afternoon. He frequently refuses this. He says he feels too groggy when he takes it. He still wants the bedtime dose. Nursing says he is very pleasant, no behavioral issues. He does seem bored. He walks around quite a bit, talks to other residents, watches TV.       ALLERGIES:Patient has no known allergies.  PAST MEDICAL HISTORY:   Past Medical History:   Diagnosis Date     Alcohol abuse      Anxiety      Back pain, chronic      Depression      Hypertension      Substance abuse (H)      PAST SURGICAL HISTORY:   has a past surgical history that includes Tonsillectomy and Extraction(s) dental.  FAMILY HISTORY: family history is not on file.  SOCIAL HISTORY:  reports that he has been smoking cigarettes and cigarettes. He has a 20 pack-year smoking history. He has never used smokeless tobacco. He reports current alcohol use. He reports that he does not use drugs.    MEDICATIONS:  Current Outpatient Medications   Medication Sig Dispense Refill     folic acid (FOLVITE) 1 MG tablet Take 1 tablet (1 mg) by mouth daily.       ketoconazole (NIZORAL) 2 % external shampoo Apply topically every evening. To beard x2 weeks for seborrheic dermatitis       Lidocaine (LIDOCARE) 4 % Patch Place 1  patch onto the skin every 24 hours. To prevent lidocaine toxicity, patient should be patch free for 12 hrs daily.       melatonin 3 MG tablet Take 6 mg by mouth at bedtime.       tamsulosin (FLOMAX) 0.4 MG capsule Take 1 capsule (0.4 mg) by mouth every evening.       thiamine (B-1) 100 MG tablet Take 200 mg by mouth daily.       traZODone (DESYREL) 50 MG tablet Take 50 mg by mouth at bedtime. And take 25mg in the afternoon           Case Management:  I have reviewed the care plan and MDS and do agree with the plan. Information reviewed:  Medications, vital signs, orders, and nursing notes.    ROS:  4 point ROS including Respiratory, CV, GI and , other than that noted in the HPI,  is negative    Vitals:  /77   Pulse 63   Temp 98.8  F (37.1  C)   Resp 18   Ht 1.829 m (6')   Wt 89 kg (196 lb 3.2 oz)   SpO2 98%   BMI 26.61 kg/m    Body mass index is 26.61 kg/m .  Exam:  GENERAL APPEARANCE:  Alert, in no distress  ENT:  Mouth and posterior oropharynx normal, moist mucous membranes, normal hearing acuity  EYES:  EOM normal, conjunctiva and lids normal  RESP:  no respiratory distress  CV:  no edema  PSYCH:  memory impaired , affect and mood normal    Lab/Diagnostic data:   Recent labs in [facility] reviewed by me today.     ASSESSMENT/PLAN  (G47.01) Insomnia due to medical condition  (primary encounter diagnosis)  Comment: Agree that there is no indication for the afternoon dose of trazodone. Will continue the bedtime dose    (F10.27) Moderate dementia associated with alcoholism, without behavioral disturbance, psychotic disturbance, mood disturbance, or anxiety (H)  (F10.90) Alcohol use disorder  Comment: Patient has been doing well here, however he is young to be in a nursing home. He likely could do well at a group home or halfway. The unknown factor is whether he would attempt to obtain alcohol if he were in a less restrictive environment. He has not been drinking here  Plan: Continue current POC with no changes  at this time and adjustments as needed.    (N40.0) Benign prostatic hyperplasia without lower urinary tract symptoms  Comment: Asymptomatic  Plan: Continue current POC with no changes at this time and adjustments as needed.      Orders  Discontinue afternoon trazodone    Electronically signed by:  MALATHI Rodriguez CNP            Sincerely,        MALATHI Rodriguez CNP    Electronically signed

## 2025-07-09 NOTE — PROGRESS NOTES
[facility] GERIATRICS  Chief Complaint   Patient presents with    [facility] Medical Record Number:  [id number]  Place of Service where encounter took place:  [facility] () [[id number]]    HPI:    Carlos Ivy  is 59 year old (1965), who is being seen today for a federally mandated E/M visit.     Today's concerns are:  Insomnia due to medical condition  Moderate dementia associated with alcoholism, unspecified whether behavioral, psychotic, or mood disturbance or anxiety (H)  Alcohol use disorder  Benign prostatic hyperplasia without lower urinary tract symptoms  Patient has an order for a trazodone in the afternoon. He frequently refuses this. He says he feels too groggy when he takes it. He still wants the bedtime dose. Nursing says he is very pleasant, no behavioral issues. He does seem bored. He walks around quite a bit, talks to other residents, watches TV.       ALLERGIES:Patient has no known allergies.  PAST MEDICAL HISTORY:   Past Medical History:   Diagnosis Date    Alcohol abuse     Anxiety     Back pain, chronic     Depression     Hypertension     Substance abuse (H)      PAST SURGICAL HISTORY:   has a past surgical history that includes Tonsillectomy and Extraction(s) dental.  FAMILY HISTORY: family history is not on file.  SOCIAL HISTORY:  reports that he has been smoking cigarettes and cigarettes. He has a 20 pack-year smoking history. He has never used smokeless tobacco. He reports current alcohol use. He reports that he does not use drugs.    MEDICATIONS:  Current Outpatient Medications   Medication Sig Dispense Refill    folic acid (FOLVITE) 1 MG tablet Take 1 tablet (1 mg) by mouth daily.      ketoconazole (NIZORAL) 2 % external shampoo Apply topically every evening. To beard x2 weeks for seborrheic dermatitis      Lidocaine (LIDOCARE) 4 % Patch Place 1 patch onto the skin every 24 hours. To prevent lidocaine toxicity, patient should be patch free for  12 hrs daily.      melatonin 3 MG tablet Take 6 mg by mouth at bedtime.      tamsulosin (FLOMAX) 0.4 MG capsule Take 1 capsule (0.4 mg) by mouth every evening.      thiamine (B-1) 100 MG tablet Take 200 mg by mouth daily.      traZODone (DESYREL) 50 MG tablet Take 50 mg by mouth at bedtime. And take 25mg in the afternoon           Case Management:  I have reviewed the care plan and MDS and do agree with the plan. Information reviewed:  Medications, vital signs, orders, and nursing notes.    ROS:  4 point ROS including Respiratory, CV, GI and , other than that noted in the HPI,  is negative    Vitals:  /77   Pulse 63   Temp 98.8  F (37.1  C)   Resp 18   Ht 1.829 m (6')   Wt 89 kg (196 lb 3.2 oz)   SpO2 98%   BMI 26.61 kg/m    Body mass index is 26.61 kg/m .  Exam:  GENERAL APPEARANCE:  Alert, in no distress  ENT:  Mouth and posterior oropharynx normal, moist mucous membranes, normal hearing acuity  EYES:  EOM normal, conjunctiva and lids normal  RESP:  no respiratory distress  CV:  no edema  PSYCH:  memory impaired , affect and mood normal    Lab/Diagnostic data:   Recent labs in [facility] reviewed by me today.     ASSESSMENT/PLAN  (G47.01) Insomnia due to medical condition  (primary encounter diagnosis)  Comment: Agree that there is no indication for the afternoon dose of trazodone. Will continue the bedtime dose    (F10.27) Moderate dementia associated with alcoholism, without behavioral disturbance, psychotic disturbance, mood disturbance, or anxiety (H)  (F10.90) Alcohol use disorder  Comment: Patient has been doing well here, however he is young to be in a nursing home. He likely could do well at a group home or [facility]. The unknown factor is whether he would attempt to obtain alcohol if he were in a less restrictive environment. He has not been drinking here  Plan: Continue current POC with no changes at this time and adjustments as needed.    (N40.0) Benign prostatic hyperplasia without lower urinary  tract symptoms  Comment: Asymptomatic  Plan: Continue current POC with no changes at this time and adjustments as needed.      Orders  Discontinue afternoon trazodone    Electronically signed by:  MALATHI Rodriguez CNP

## 2025-07-10 RX ORDER — TRAZODONE HYDROCHLORIDE 50 MG/1
50 TABLET ORAL AT BEDTIME
Status: SHIPPED
Start: 2025-07-10

## 2025-09-02 ENCOUNTER — NURSING HOME VISIT (OUTPATIENT)
Dept: GERIATRICS | Facility: CLINIC | Age: 60
End: 2025-09-02
Payer: COMMERCIAL

## 2025-09-02 VITALS
TEMPERATURE: 98.8 F | HEART RATE: 63 BPM | DIASTOLIC BLOOD PRESSURE: 77 MMHG | RESPIRATION RATE: 18 BRPM | OXYGEN SATURATION: 98 % | BODY MASS INDEX: 26.01 KG/M2 | SYSTOLIC BLOOD PRESSURE: 131 MMHG | HEIGHT: 72 IN | WEIGHT: 192 LBS

## 2025-09-02 DIAGNOSIS — F33.9 RECURRENT MAJOR DEPRESSIVE DISORDER, REMISSION STATUS UNSPECIFIED: ICD-10-CM

## 2025-09-02 DIAGNOSIS — F10.90 ALCOHOL USE DISORDER: ICD-10-CM

## 2025-09-02 DIAGNOSIS — E51.2 WERNICKE ENCEPHALOPATHY: Primary | ICD-10-CM

## 2025-09-02 DIAGNOSIS — I10 PRIMARY HYPERTENSION: ICD-10-CM
